# Patient Record
Sex: MALE | Race: OTHER | HISPANIC OR LATINO | URBAN - METROPOLITAN AREA
[De-identification: names, ages, dates, MRNs, and addresses within clinical notes are randomized per-mention and may not be internally consistent; named-entity substitution may affect disease eponyms.]

---

## 2019-12-09 VITALS
TEMPERATURE: 98 F | SYSTOLIC BLOOD PRESSURE: 110 MMHG | HEART RATE: 85 BPM | HEIGHT: 64 IN | RESPIRATION RATE: 18 BRPM | OXYGEN SATURATION: 98 % | DIASTOLIC BLOOD PRESSURE: 73 MMHG | WEIGHT: 198.64 LBS

## 2019-12-10 ENCOUNTER — INPATIENT (INPATIENT)
Facility: HOSPITAL | Age: 39
LOS: 2 days | Discharge: HOME | DRG: 460 | End: 2019-12-13
Attending: ORTHOPAEDIC SURGERY | Admitting: ORTHOPAEDIC SURGERY
Payer: OTHER MISCELLANEOUS

## 2019-12-10 DIAGNOSIS — Z98.890 OTHER SPECIFIED POSTPROCEDURAL STATES: Chronic | ICD-10-CM

## 2019-12-10 DIAGNOSIS — Z41.9 ENCOUNTER FOR PROCEDURE FOR PURPOSES OTHER THAN REMEDYING HEALTH STATE, UNSPECIFIED: Chronic | ICD-10-CM

## 2019-12-10 DIAGNOSIS — M54.16 RADICULOPATHY, LUMBAR REGION: ICD-10-CM

## 2019-12-10 LAB
ANION GAP SERPL CALC-SCNC: 8 MMOL/L — SIGNIFICANT CHANGE UP (ref 5–17)
BASOPHILS # BLD AUTO: 0.02 K/UL — SIGNIFICANT CHANGE UP (ref 0–0.2)
BASOPHILS NFR BLD AUTO: 0.2 % — SIGNIFICANT CHANGE UP (ref 0–2)
BUN SERPL-MCNC: 19 MG/DL — SIGNIFICANT CHANGE UP (ref 7–23)
CALCIUM SERPL-MCNC: 7.8 MG/DL — LOW (ref 8.4–10.5)
CHLORIDE SERPL-SCNC: 109 MMOL/L — HIGH (ref 96–108)
CO2 SERPL-SCNC: 22 MMOL/L — SIGNIFICANT CHANGE UP (ref 22–31)
CREAT SERPL-MCNC: 0.8 MG/DL — SIGNIFICANT CHANGE UP (ref 0.5–1.3)
EOSINOPHIL # BLD AUTO: 0.03 K/UL — SIGNIFICANT CHANGE UP (ref 0–0.5)
EOSINOPHIL NFR BLD AUTO: 0.4 % — SIGNIFICANT CHANGE UP (ref 0–6)
GLUCOSE SERPL-MCNC: 108 MG/DL — HIGH (ref 70–99)
HCT VFR BLD CALC: 33.6 % — LOW (ref 39–50)
HGB BLD-MCNC: 10.7 G/DL — LOW (ref 13–17)
IMM GRANULOCYTES NFR BLD AUTO: 0.5 % — SIGNIFICANT CHANGE UP (ref 0–1.5)
LYMPHOCYTES # BLD AUTO: 1.58 K/UL — SIGNIFICANT CHANGE UP (ref 1–3.3)
LYMPHOCYTES # BLD AUTO: 19 % — SIGNIFICANT CHANGE UP (ref 13–44)
MCHC RBC-ENTMCNC: 27.3 PG — SIGNIFICANT CHANGE UP (ref 27–34)
MCHC RBC-ENTMCNC: 31.8 GM/DL — LOW (ref 32–36)
MCV RBC AUTO: 85.7 FL — SIGNIFICANT CHANGE UP (ref 80–100)
MONOCYTES # BLD AUTO: 0.2 K/UL — SIGNIFICANT CHANGE UP (ref 0–0.9)
MONOCYTES NFR BLD AUTO: 2.4 % — SIGNIFICANT CHANGE UP (ref 2–14)
NEUTROPHILS # BLD AUTO: 6.44 K/UL — SIGNIFICANT CHANGE UP (ref 1.8–7.4)
NEUTROPHILS NFR BLD AUTO: 77.5 % — HIGH (ref 43–77)
NRBC # BLD: 0 /100 WBCS — SIGNIFICANT CHANGE UP (ref 0–0)
PLATELET # BLD AUTO: 234 K/UL — SIGNIFICANT CHANGE UP (ref 150–400)
POTASSIUM SERPL-MCNC: 4.8 MMOL/L — SIGNIFICANT CHANGE UP (ref 3.5–5.3)
POTASSIUM SERPL-SCNC: 4.8 MMOL/L — SIGNIFICANT CHANGE UP (ref 3.5–5.3)
RBC # BLD: 3.92 M/UL — LOW (ref 4.2–5.8)
RBC # FLD: 14.2 % — SIGNIFICANT CHANGE UP (ref 10.3–14.5)
SODIUM SERPL-SCNC: 139 MMOL/L — SIGNIFICANT CHANGE UP (ref 135–145)
WBC # BLD: 8.31 K/UL — SIGNIFICANT CHANGE UP (ref 3.8–10.5)
WBC # FLD AUTO: 8.31 K/UL — SIGNIFICANT CHANGE UP (ref 3.8–10.5)

## 2019-12-10 RX ORDER — HYDROMORPHONE HYDROCHLORIDE 2 MG/ML
0.5 INJECTION INTRAMUSCULAR; INTRAVENOUS; SUBCUTANEOUS
Refills: 0 | Status: DISCONTINUED | OUTPATIENT
Start: 2019-12-10 | End: 2019-12-13

## 2019-12-10 RX ORDER — CEFAZOLIN SODIUM 1 G
2000 VIAL (EA) INJECTION EVERY 8 HOURS
Refills: 0 | Status: COMPLETED | OUTPATIENT
Start: 2019-12-10 | End: 2019-12-11

## 2019-12-10 RX ORDER — METOCLOPRAMIDE HCL 10 MG
10 TABLET ORAL ONCE
Refills: 0 | Status: DISCONTINUED | OUTPATIENT
Start: 2019-12-10 | End: 2019-12-11

## 2019-12-10 RX ORDER — DEXAMETHASONE 0.5 MG/5ML
10 ELIXIR ORAL EVERY 8 HOURS
Refills: 0 | Status: COMPLETED | OUTPATIENT
Start: 2019-12-10 | End: 2019-12-11

## 2019-12-10 RX ORDER — SODIUM CHLORIDE 9 MG/ML
1000 INJECTION, SOLUTION INTRAVENOUS
Refills: 0 | Status: DISCONTINUED | OUTPATIENT
Start: 2019-12-10 | End: 2019-12-11

## 2019-12-10 RX ORDER — CEFAZOLIN SODIUM 1 G
2000 VIAL (EA) INJECTION EVERY 8 HOURS
Refills: 0 | Status: DISCONTINUED | OUTPATIENT
Start: 2019-12-10 | End: 2019-12-10

## 2019-12-10 RX ORDER — OXYCODONE HYDROCHLORIDE 5 MG/1
10 TABLET ORAL EVERY 4 HOURS
Refills: 0 | Status: DISCONTINUED | OUTPATIENT
Start: 2019-12-10 | End: 2019-12-13

## 2019-12-10 RX ORDER — HYDROMORPHONE HYDROCHLORIDE 2 MG/ML
0.5 INJECTION INTRAMUSCULAR; INTRAVENOUS; SUBCUTANEOUS
Refills: 0 | Status: DISCONTINUED | OUTPATIENT
Start: 2019-12-10 | End: 2019-12-11

## 2019-12-10 RX ORDER — MAGNESIUM HYDROXIDE 400 MG/1
30 TABLET, CHEWABLE ORAL EVERY 12 HOURS
Refills: 0 | Status: DISCONTINUED | OUTPATIENT
Start: 2019-12-10 | End: 2019-12-13

## 2019-12-10 RX ORDER — LANOLIN ALCOHOL/MO/W.PET/CERES
5 CREAM (GRAM) TOPICAL AT BEDTIME
Refills: 0 | Status: DISCONTINUED | OUTPATIENT
Start: 2019-12-10 | End: 2019-12-10

## 2019-12-10 RX ORDER — SODIUM CHLORIDE 9 MG/ML
500 INJECTION, SOLUTION INTRAVENOUS ONCE
Refills: 0 | Status: COMPLETED | OUTPATIENT
Start: 2019-12-10 | End: 2019-12-10

## 2019-12-10 RX ORDER — CHLORHEXIDINE GLUCONATE 213 G/1000ML
1 SOLUTION TOPICAL ONCE
Refills: 0 | Status: DISCONTINUED | OUTPATIENT
Start: 2019-12-10 | End: 2019-12-10

## 2019-12-10 RX ORDER — ACETAMINOPHEN 500 MG
650 TABLET ORAL EVERY 6 HOURS
Refills: 0 | Status: DISCONTINUED | OUTPATIENT
Start: 2019-12-10 | End: 2019-12-13

## 2019-12-10 RX ORDER — GABAPENTIN 400 MG/1
800 CAPSULE ORAL DAILY
Refills: 0 | Status: DISCONTINUED | OUTPATIENT
Start: 2019-12-10 | End: 2019-12-13

## 2019-12-10 RX ORDER — SENNA PLUS 8.6 MG/1
2 TABLET ORAL AT BEDTIME
Refills: 0 | Status: DISCONTINUED | OUTPATIENT
Start: 2019-12-10 | End: 2019-12-13

## 2019-12-10 RX ORDER — POVIDONE-IODINE 5 %
1 AEROSOL (ML) TOPICAL ONCE
Refills: 0 | Status: DISCONTINUED | OUTPATIENT
Start: 2019-12-10 | End: 2019-12-10

## 2019-12-10 RX ORDER — OXYCODONE HYDROCHLORIDE 5 MG/1
5 TABLET ORAL EVERY 4 HOURS
Refills: 0 | Status: DISCONTINUED | OUTPATIENT
Start: 2019-12-10 | End: 2019-12-13

## 2019-12-10 RX ORDER — MAGNESIUM SULFATE 500 MG/ML
2 VIAL (ML) INJECTION ONCE
Refills: 0 | Status: DISCONTINUED | OUTPATIENT
Start: 2019-12-10 | End: 2019-12-11

## 2019-12-10 RX ADMIN — SENNA PLUS 2 TABLET(S): 8.6 TABLET ORAL at 21:21

## 2019-12-10 RX ADMIN — Medication 102 MILLIGRAM(S): at 19:03

## 2019-12-10 RX ADMIN — HYDROMORPHONE HYDROCHLORIDE 0.5 MILLIGRAM(S): 2 INJECTION INTRAMUSCULAR; INTRAVENOUS; SUBCUTANEOUS at 14:06

## 2019-12-10 RX ADMIN — HYDROMORPHONE HYDROCHLORIDE 0.5 MILLIGRAM(S): 2 INJECTION INTRAMUSCULAR; INTRAVENOUS; SUBCUTANEOUS at 15:50

## 2019-12-10 RX ADMIN — Medication 2000 MILLIGRAM(S): at 19:03

## 2019-12-10 RX ADMIN — HYDROMORPHONE HYDROCHLORIDE 0.5 MILLIGRAM(S): 2 INJECTION INTRAMUSCULAR; INTRAVENOUS; SUBCUTANEOUS at 14:40

## 2019-12-10 RX ADMIN — HYDROMORPHONE HYDROCHLORIDE 0.5 MILLIGRAM(S): 2 INJECTION INTRAMUSCULAR; INTRAVENOUS; SUBCUTANEOUS at 15:30

## 2019-12-10 RX ADMIN — SODIUM CHLORIDE 2000 MILLILITER(S): 9 INJECTION, SOLUTION INTRAVENOUS at 14:00

## 2019-12-10 NOTE — PROGRESS NOTE ADULT - SUBJECTIVE AND OBJECTIVE BOX
Orthopedics Post Op Check    Procedure: LUMBAR DECOMPRESSION/FUSION L5-S1  Surgeon:FABIO Adam. stable, c/o pain pain in low back. Denies CP, SOB, N/V, numbness/tingling in b/l les.     Vital Signs Last 24 Hrs  T(C): 36.2 (10 Dec 2019 13:42), Max: 36.2 (10 Dec 2019 13:42)  T(F): 97.2 (10 Dec 2019 13:42), Max: 97.2 (10 Dec 2019 13:42)  HR: 84 (10 Dec 2019 14:38) (82 - 84)  BP: 97/56 (10 Dec 2019 14:30) (79/49 - 102/47)  BP(mean): 75 (10 Dec 2019 14:30) (53 - 75)  RR: 20 (10 Dec 2019 14:38) (20 - 27)  SpO2: 97% (10 Dec 2019 14:38) (95% - 97%)      Dressing C/D/I with 1 drain     Pulses: DP/PT 2+ B/L LES  SLT:  intact B/L LES  Motor:  EHL/FHL/TA/GS  5/5 b/l les                          10.7   8.31  )-----------( 234      ( 10 Dec 2019 14:39 )             33.6   12-10    139  |  109<H>  |  19  ----------------------------<  108<H>  4.8   |  22  |  0.80    Ca    7.8<L>      10 Dec 2019 14:39      Post op XR: pending pod 2    A/P: 39yoMale POD#0 s/p LUMBAR DECOMPRESSION/FUSION L5-S1  - Stable  - Pain Control  - DVT ppx: scds  - Post op abx: ancef   - PT, WBS: wbat b/l les  - F/U AM Labs

## 2019-12-10 NOTE — H&P ADULT - NSHPLABSRESULTS_GEN_ALL_CORE
Preop CBC, BMP, PT/PTT/INR, UA - WNL per medical clearance  Preop EKG - sinus rhythm - WNL per medical clearance

## 2019-12-10 NOTE — H&P ADULT - HISTORY OF PRESENT ILLNESS
39M c/o low back pain x     Present for elective Lami/Fusion L5-S1 39M c/o low back pain x one month. Pt endorses work related injury on Nov 8 endorsing an object falling onto his head. He states his low back pain radiates down his posterior left lower extremity. He denies numbness/tingling/weakness of bilateral lower extremities. Pt takes gabapentin for his symptoms - he has failed conservative management. Pt has ambulated intermittently with a cane since time of injury. He denies DVT hx; denies CP, SOB, N/V, tactile fevers.    Present for elective Lami/Fusion L5-S1

## 2019-12-10 NOTE — H&P ADULT - NSHPPHYSICALEXAM_GEN_ALL_CORE
MSK: + decreased ROM 2/2 pain, lumbosacral spine  Skin warm and well perfused, no visible wounds/erythema/ecchymoses  EHL/FHL/TA/GS   SLT   DP/PT pulses    Remainder of exam per medical clearance note MSK: + decreased ROM 2/2 pain, lumbosacral spine  Skin warm and well perfused, no visible wounds/erythema/ecchymoses  EHL/FHL/TA/GS 5/5 motor strength bilaterally   SLT in tact to distal bilateral lower extremities   DP/PT pulses 2+    Remainder of exam per medical clearance note

## 2019-12-10 NOTE — H&P ADULT - PROBLEM SELECTOR PLAN 1
Admit to Orthopaedic Service.  Presents today for elective Lami/PSF L5-S1   Pt medically stable and cleared for procedure today by  Admit to Orthopaedic Service.  Presents today for elective Lami/PSF L5-S1   Pt medically stable and cleared for procedure today by Dr. Duenas

## 2019-12-10 NOTE — PACU DISCHARGE NOTE - COMMENTS
PT met pacu criteria to be tx to regional. Vitals stable, no bleeding noted, pain controlled.  Report given to HILARIO Gloria on [id number]

## 2019-12-11 ENCOUNTER — TRANSCRIPTION ENCOUNTER (OUTPATIENT)
Age: 39
End: 2019-12-11

## 2019-12-11 LAB
ALBUMIN SERPL ELPH-MCNC: 3.4 G/DL — SIGNIFICANT CHANGE UP (ref 3.3–5)
ANION GAP SERPL CALC-SCNC: 9 MMOL/L — SIGNIFICANT CHANGE UP (ref 5–17)
BASOPHILS # BLD AUTO: 0.02 K/UL — SIGNIFICANT CHANGE UP (ref 0–0.2)
BASOPHILS NFR BLD AUTO: 0.1 % — SIGNIFICANT CHANGE UP (ref 0–2)
BUN SERPL-MCNC: 15 MG/DL — SIGNIFICANT CHANGE UP (ref 7–23)
CALCIUM SERPL-MCNC: 8 MG/DL — LOW (ref 8.4–10.5)
CHLORIDE SERPL-SCNC: 106 MMOL/L — SIGNIFICANT CHANGE UP (ref 96–108)
CO2 SERPL-SCNC: 22 MMOL/L — SIGNIFICANT CHANGE UP (ref 22–31)
CREAT SERPL-MCNC: 0.72 MG/DL — SIGNIFICANT CHANGE UP (ref 0.5–1.3)
EOSINOPHIL # BLD AUTO: 0 K/UL — SIGNIFICANT CHANGE UP (ref 0–0.5)
EOSINOPHIL NFR BLD AUTO: 0 % — SIGNIFICANT CHANGE UP (ref 0–6)
GLUCOSE SERPL-MCNC: 139 MG/DL — HIGH (ref 70–99)
HCT VFR BLD CALC: 35 % — LOW (ref 39–50)
HGB BLD-MCNC: 11.3 G/DL — LOW (ref 13–17)
IMM GRANULOCYTES NFR BLD AUTO: 0.7 % — SIGNIFICANT CHANGE UP (ref 0–1.5)
LYMPHOCYTES # BLD AUTO: 1.22 K/UL — SIGNIFICANT CHANGE UP (ref 1–3.3)
LYMPHOCYTES # BLD AUTO: 7.2 % — LOW (ref 13–44)
MCHC RBC-ENTMCNC: 27.2 PG — SIGNIFICANT CHANGE UP (ref 27–34)
MCHC RBC-ENTMCNC: 32.3 GM/DL — SIGNIFICANT CHANGE UP (ref 32–36)
MCV RBC AUTO: 84.1 FL — SIGNIFICANT CHANGE UP (ref 80–100)
MONOCYTES # BLD AUTO: 0.58 K/UL — SIGNIFICANT CHANGE UP (ref 0–0.9)
MONOCYTES NFR BLD AUTO: 3.4 % — SIGNIFICANT CHANGE UP (ref 2–14)
NEUTROPHILS # BLD AUTO: 14.9 K/UL — HIGH (ref 1.8–7.4)
NEUTROPHILS NFR BLD AUTO: 88.6 % — HIGH (ref 43–77)
NRBC # BLD: 0 /100 WBCS — SIGNIFICANT CHANGE UP (ref 0–0)
PLATELET # BLD AUTO: 244 K/UL — SIGNIFICANT CHANGE UP (ref 150–400)
POTASSIUM SERPL-MCNC: 4.3 MMOL/L — SIGNIFICANT CHANGE UP (ref 3.5–5.3)
POTASSIUM SERPL-SCNC: 4.3 MMOL/L — SIGNIFICANT CHANGE UP (ref 3.5–5.3)
RBC # BLD: 4.16 M/UL — LOW (ref 4.2–5.8)
RBC # FLD: 14 % — SIGNIFICANT CHANGE UP (ref 10.3–14.5)
SODIUM SERPL-SCNC: 137 MMOL/L — SIGNIFICANT CHANGE UP (ref 135–145)
WBC # BLD: 16.83 K/UL — HIGH (ref 3.8–10.5)
WBC # FLD AUTO: 16.83 K/UL — HIGH (ref 3.8–10.5)

## 2019-12-11 PROCEDURE — 99255 IP/OBS CONSLTJ NEW/EST HI 80: CPT

## 2019-12-11 RX ORDER — POLYETHYLENE GLYCOL 3350 17 G/17G
17 POWDER, FOR SOLUTION ORAL DAILY
Refills: 0 | Status: DISCONTINUED | OUTPATIENT
Start: 2019-12-11 | End: 2019-12-13

## 2019-12-11 RX ORDER — BENZOCAINE AND MENTHOL 5; 1 G/100ML; G/100ML
2 LIQUID ORAL EVERY 4 HOURS
Refills: 0 | Status: DISCONTINUED | OUTPATIENT
Start: 2019-12-11 | End: 2019-12-13

## 2019-12-11 RX ADMIN — OXYCODONE HYDROCHLORIDE 10 MILLIGRAM(S): 5 TABLET ORAL at 17:12

## 2019-12-11 RX ADMIN — BENZOCAINE AND MENTHOL 2 LOZENGE: 5; 1 LIQUID ORAL at 12:23

## 2019-12-11 RX ADMIN — GABAPENTIN 800 MILLIGRAM(S): 400 CAPSULE ORAL at 12:23

## 2019-12-11 RX ADMIN — OXYCODONE HYDROCHLORIDE 10 MILLIGRAM(S): 5 TABLET ORAL at 18:12

## 2019-12-11 RX ADMIN — Medication 2000 MILLIGRAM(S): at 02:16

## 2019-12-11 RX ADMIN — Medication 102 MILLIGRAM(S): at 02:16

## 2019-12-11 RX ADMIN — OXYCODONE HYDROCHLORIDE 10 MILLIGRAM(S): 5 TABLET ORAL at 10:35

## 2019-12-11 RX ADMIN — SENNA PLUS 2 TABLET(S): 8.6 TABLET ORAL at 21:30

## 2019-12-11 RX ADMIN — POLYETHYLENE GLYCOL 3350 17 GRAM(S): 17 POWDER, FOR SOLUTION ORAL at 12:25

## 2019-12-11 RX ADMIN — Medication 102 MILLIGRAM(S): at 10:35

## 2019-12-11 RX ADMIN — OXYCODONE HYDROCHLORIDE 10 MILLIGRAM(S): 5 TABLET ORAL at 11:35

## 2019-12-11 RX ADMIN — BENZOCAINE AND MENTHOL 2 LOZENGE: 5; 1 LIQUID ORAL at 17:29

## 2019-12-11 NOTE — DISCHARGE NOTE PROVIDER - NSDCMRMEDTOKEN_GEN_ALL_CORE_FT
gabapentin 800 mg oral tablet: 1 tab(s) orally once a day  Lumbar Corset: acetaminophen 325 mg oral tablet: 2 tab(s) orally every 6 hours, As needed, Temp greater or equal to 38C (100.4F), Mild Pain (1 - 3)  cefadroxil 500 mg oral capsule: 1 cap(s) orally 2 times a day   gabapentin 800 mg oral tablet: 1 tab(s) orally once a day  Lumbar Corset:   oxyCODONE 5 mg oral tablet: 1-2 tab(s) orally every 4-6 hours as needed for severe pain MDD:6  polyethylene glycol 3350 oral powder for reconstitution: 17 gram(s) orally once a day  senna oral tablet: 2 tab(s) orally once a day (at bedtime)

## 2019-12-11 NOTE — DISCHARGE NOTE PROVIDER - CARE PROVIDER_API CALL
Vincenzo Duenas (DO)  Orthopaedic Surgery  [address] [place], [place] [address]  Phone: [phone]  Fax: [phone]  Follow Up Time:

## 2019-12-11 NOTE — DISCHARGE NOTE PROVIDER - HOSPITAL COURSE
Admitted    Surgery - 12/10/2019 - L5-S1 fusion    Macy-op Antibiotics    Pain control    DVT prophylaxis    OOB/Physical Therapy

## 2019-12-11 NOTE — PROGRESS NOTE ADULT - SUBJECTIVE AND OBJECTIVE BOX
Ortho Note    Pt comfortable without complaints, pain controlled  Denies CP, SOB, N/V, numbness/tingling     Vital Signs Last 24 Hrs  AVSS    General: Pt Alert and oriented, NAD  DSG C/D/I back, drain x 1  Pulses intact b/l LE  Sensation intact b/l LE  Motor: EHL/FHL/TA/GS 5/5 b/l                          11.3   16.83 )-----------( 244      ( 11 Dec 2019 08:09 )             35.0   11 Dec 2019 08:09    137    |  106    |  15     ----------------------------<  139    4.3     |  22     |  0.72           A/P: 39yMale POD#1 s/p PSF L5-S1  - Stable  - Pain Control  - DVT ppx: scds  - PT, WBS: wbat    Ortho Pager [phone]

## 2019-12-11 NOTE — CONSULT NOTE ADULT - ASSESSMENT
39 year old M s/p PSF, medicine following for comanagement.     1) Post-op state: c/w pain control, c/w bowel regimen, c/w IS  2) Cough: likely 2/2 viral URI, no fevers/chills/cough, non-toxic appearing, crackles at L base likely atelectasis  3) Leukocytosis: likely reactive to surgery  4) Anemia: likely acute blood loss from surgery

## 2019-12-11 NOTE — DISCHARGE NOTE PROVIDER - NSDCFUADDINST_GEN_ALL_CORE_FT
No strenuous activity (bending/twisting), heavy lifting, driving or returning to work until cleared by MD.  Change dressing daily with gauze/tape till post-op day #5, then leave incision open to air.  You may shower post-op day#5, keep incision clean and dry.   Try to have regular bowel movements, take stool softener or laxative if necessary.  May take pepcid or prilosec for upset stomach.  May apply ice to affected areas to decrease swelling.  Call to schedule an appt with Dr. Duenas for follow up, if you have staples or sutures they will be removed in office.  Contact your doctor if you experience: fever greater than 101.5, chills, chest pain, difficulty breathing, redness or excessive drainage around the incision, other concerns.  Follow up with your primary care provider.

## 2019-12-11 NOTE — PROGRESS NOTE ADULT - SUBJECTIVE AND OBJECTIVE BOX
pt seen and examined nad avss    pe dressing cdi   nvi   power 5/5  sensation grossly intact   no calf tenderness    imp sp psf    plan     ambulation pt  pain control  scd  drain  xrays

## 2019-12-11 NOTE — DISCHARGE NOTE PROVIDER - NSDCCPTREATMENT_GEN_ALL_CORE_FT
PRINCIPAL PROCEDURE  Procedure: Decompression, spine, lumbar, with fusion  Findings and Treatment: L5-S1

## 2019-12-11 NOTE — CONSULT NOTE ADULT - SUBJECTIVE AND OBJECTIVE BOX
Patient is a 39y old  Male who presents with a chief complaint of low back pain (11 Dec 2019 10:53)      HPI:  39M c/o low back pain x one month. Pt endorses work related injury on Nov 8 endorsing an object falling onto his head. He states his low back pain radiates down his posterior left lower extremity. He denies numbness/tingling/weakness of bilateral lower extremities. Pt takes gabapentin for his symptoms - he has failed conservative management. Pt has ambulated intermittently with a cane since time of injury. He denies DVT hx; denies CP, SOB, N/V, tactile fevers.    Present for elective Lami/Fusion L5-S1 (10 Dec 2019 08:13)    Seen in AM, POD 1, pain controlled. Has mild cough, not very productive. Lives with wife and small child. Denies fevers, chills, shortness of breath.     REVIEW OF SYSTEMS      General:	    Skin/Breast:  	  Ophthalmologic:  	  ENMT:	    Respiratory and Thorax:  	  Cardiovascular:	    Gastrointestinal:	    Genitourinary:	    Musculoskeletal:	    Neurological:	    Psychiatric:	    Hematology/Lymphatics:	    Endocrine:	    Allergic/Immunologic:	    Allergies    No Known Allergies    Intolerances        Home Medications:  gabapentin 800 mg oral tablet: 1 tab(s) orally once a day (10 Dec 2019 08:22)      PAST MEDICAL & SURGICAL HISTORY:  Lumbar radiculopathy  H/O left knee surgery  Elective surgery: left shoulder surgery      FAMILY HISTORY: noncontributory      SOCIAL HISTORY: no smoking or illicit drug use      Vital Signs Last 24 Hrs  T(C): 36.5 (11 Dec 2019 08:20), Max: 36.7 (11 Dec 2019 00:15)  T(F): 97.7 (11 Dec 2019 08:20), Max: 98 (11 Dec 2019 00:15)  HR: 83 (11 Dec 2019 08:20) (80 - 95)  BP: 95/55 (11 Dec 2019 08:20) (91/47 - 110/65)  BP(mean): 78 (10 Dec 2019 16:30) (58 - 82)  RR: 16 (11 Dec 2019 08:20) (15 - 31)  SpO2: 95% (11 Dec 2019 08:20) (95% - 97%)   I&O's Detail    10 Dec 2019 07:01  -  11 Dec 2019 07:00  --------------------------------------------------------  IN:    lactated ringers.: 360 mL  Total IN: 360 mL    OUT:    Accordian: 25 mL    Voided: 680 mL  Total OUT: 705 mL    Total NET: -345 mL        Daily     Daily     Physical Exam:   GEN: NAD, AAOx3  HEENT: MMM,   CV: S1 S2 RRR, no MRG  Lung: minimal crackles L base, otherwise clear, no rhonchi, no egophony  Abd: soft NT ND +BS  Ext: WWP    MEDICATIONS  (STANDING):  gabapentin 800 milliGRAM(s) Oral daily  polyethylene glycol 3350 17 Gram(s) Oral daily  senna 2 Tablet(s) Oral at bedtime    MEDICATIONS  (PRN):  acetaminophen   Tablet .. 650 milliGRAM(s) Oral every 6 hours PRN Temp greater or equal to 38C (100.4F), Mild Pain (1 - 3)  aluminum hydroxide/magnesium hydroxide/simethicone Suspension 30 milliLiter(s) Oral every 12 hours PRN Indigestion  benzocaine 15 mG/menthol 3.6 mG (Sugar-Free) Lozenge 2 Lozenge Oral every 4 hours PRN Sore Throat  guaiFENesin   Syrup  (Sugar-Free) 200 milliGRAM(s) Oral every 6 hours PRN Cough  HYDROmorphone  Injectable 0.5 milliGRAM(s) IV Push every 3 hours PRN BREAKTHROUGH PAIN FOR FLOOR  magnesium hydroxide Suspension 30 milliLiter(s) Oral every 12 hours PRN Constipation  oxyCODONE    IR 5 milliGRAM(s) Oral every 4 hours PRN Moderate Pain (4 - 6)  oxyCODONE    IR 10 milliGRAM(s) Oral every 4 hours PRN Severe Pain (7 - 10)                LABS:                        11.3   16.83 )-----------( 244      ( 11 Dec 2019 08:09 )             35.0     12-11    137  |  106  |  15  ----------------------------<  139<H>  4.3   |  22  |  0.72    Ca    8.0<L>      11 Dec 2019 08:09            CAPILLARY BLOOD GLUCOSE          RADIOLOGY & ADDITIONAL STUDIES:

## 2019-12-12 LAB
ANION GAP SERPL CALC-SCNC: 8 MMOL/L — SIGNIFICANT CHANGE UP (ref 5–17)
BASOPHILS # BLD AUTO: 0.01 K/UL — SIGNIFICANT CHANGE UP (ref 0–0.2)
BASOPHILS NFR BLD AUTO: 0.1 % — SIGNIFICANT CHANGE UP (ref 0–2)
BUN SERPL-MCNC: 14 MG/DL — SIGNIFICANT CHANGE UP (ref 7–23)
CALCIUM SERPL-MCNC: 8.4 MG/DL — SIGNIFICANT CHANGE UP (ref 8.4–10.5)
CHLORIDE SERPL-SCNC: 102 MMOL/L — SIGNIFICANT CHANGE UP (ref 96–108)
CO2 SERPL-SCNC: 27 MMOL/L — SIGNIFICANT CHANGE UP (ref 22–31)
CREAT SERPL-MCNC: 0.72 MG/DL — SIGNIFICANT CHANGE UP (ref 0.5–1.3)
EOSINOPHIL # BLD AUTO: 0 K/UL — SIGNIFICANT CHANGE UP (ref 0–0.5)
EOSINOPHIL NFR BLD AUTO: 0 % — SIGNIFICANT CHANGE UP (ref 0–6)
GLUCOSE SERPL-MCNC: 101 MG/DL — HIGH (ref 70–99)
HCT VFR BLD CALC: 36.8 % — LOW (ref 39–50)
HGB BLD-MCNC: 11.9 G/DL — LOW (ref 13–17)
IMM GRANULOCYTES NFR BLD AUTO: 0.7 % — SIGNIFICANT CHANGE UP (ref 0–1.5)
LYMPHOCYTES # BLD AUTO: 18.2 % — SIGNIFICANT CHANGE UP (ref 13–44)
LYMPHOCYTES # BLD AUTO: 3.07 K/UL — SIGNIFICANT CHANGE UP (ref 1–3.3)
MCHC RBC-ENTMCNC: 27.2 PG — SIGNIFICANT CHANGE UP (ref 27–34)
MCHC RBC-ENTMCNC: 32.3 GM/DL — SIGNIFICANT CHANGE UP (ref 32–36)
MCV RBC AUTO: 84 FL — SIGNIFICANT CHANGE UP (ref 80–100)
MONOCYTES # BLD AUTO: 1.46 K/UL — HIGH (ref 0–0.9)
MONOCYTES NFR BLD AUTO: 8.6 % — SIGNIFICANT CHANGE UP (ref 2–14)
NEUTROPHILS # BLD AUTO: 12.26 K/UL — HIGH (ref 1.8–7.4)
NEUTROPHILS NFR BLD AUTO: 72.4 % — SIGNIFICANT CHANGE UP (ref 43–77)
NRBC # BLD: 0 /100 WBCS — SIGNIFICANT CHANGE UP (ref 0–0)
PLATELET # BLD AUTO: 241 K/UL — SIGNIFICANT CHANGE UP (ref 150–400)
POTASSIUM SERPL-MCNC: 4.3 MMOL/L — SIGNIFICANT CHANGE UP (ref 3.5–5.3)
POTASSIUM SERPL-SCNC: 4.3 MMOL/L — SIGNIFICANT CHANGE UP (ref 3.5–5.3)
RBC # BLD: 4.38 M/UL — SIGNIFICANT CHANGE UP (ref 4.2–5.8)
RBC # FLD: 14.2 % — SIGNIFICANT CHANGE UP (ref 10.3–14.5)
SODIUM SERPL-SCNC: 137 MMOL/L — SIGNIFICANT CHANGE UP (ref 135–145)
WBC # BLD: 16.91 K/UL — HIGH (ref 3.8–10.5)
WBC # FLD AUTO: 16.91 K/UL — HIGH (ref 3.8–10.5)

## 2019-12-12 PROCEDURE — 72100 X-RAY EXAM L-S SPINE 2/3 VWS: CPT | Mod: 26

## 2019-12-12 PROCEDURE — 99233 SBSQ HOSP IP/OBS HIGH 50: CPT

## 2019-12-12 RX ORDER — ACETAMINOPHEN 500 MG
2 TABLET ORAL
Qty: 0 | Refills: 0 | DISCHARGE
Start: 2019-12-12

## 2019-12-12 RX ORDER — SENNA PLUS 8.6 MG/1
2 TABLET ORAL
Qty: 0 | Refills: 0 | DISCHARGE
Start: 2019-12-12

## 2019-12-12 RX ORDER — OXYCODONE HYDROCHLORIDE 5 MG/1
1 TABLET ORAL
Qty: 28 | Refills: 0
Start: 2019-12-12 | End: 2019-12-18

## 2019-12-12 RX ORDER — POLYETHYLENE GLYCOL 3350 17 G/17G
17 POWDER, FOR SOLUTION ORAL
Qty: 0 | Refills: 0 | DISCHARGE
Start: 2019-12-12

## 2019-12-12 RX ORDER — SODIUM CHLORIDE 9 MG/ML
1000 INJECTION INTRAMUSCULAR; INTRAVENOUS; SUBCUTANEOUS ONCE
Refills: 0 | Status: COMPLETED | OUTPATIENT
Start: 2019-12-12 | End: 2019-12-12

## 2019-12-12 RX ADMIN — BENZOCAINE AND MENTHOL 2 LOZENGE: 5; 1 LIQUID ORAL at 11:17

## 2019-12-12 RX ADMIN — OXYCODONE HYDROCHLORIDE 10 MILLIGRAM(S): 5 TABLET ORAL at 11:17

## 2019-12-12 RX ADMIN — OXYCODONE HYDROCHLORIDE 5 MILLIGRAM(S): 5 TABLET ORAL at 23:15

## 2019-12-12 RX ADMIN — POLYETHYLENE GLYCOL 3350 17 GRAM(S): 17 POWDER, FOR SOLUTION ORAL at 11:49

## 2019-12-12 RX ADMIN — OXYCODONE HYDROCHLORIDE 5 MILLIGRAM(S): 5 TABLET ORAL at 17:17

## 2019-12-12 RX ADMIN — GABAPENTIN 800 MILLIGRAM(S): 400 CAPSULE ORAL at 11:49

## 2019-12-12 RX ADMIN — OXYCODONE HYDROCHLORIDE 5 MILLIGRAM(S): 5 TABLET ORAL at 07:26

## 2019-12-12 RX ADMIN — SENNA PLUS 2 TABLET(S): 8.6 TABLET ORAL at 21:34

## 2019-12-12 RX ADMIN — OXYCODONE HYDROCHLORIDE 10 MILLIGRAM(S): 5 TABLET ORAL at 12:17

## 2019-12-12 RX ADMIN — Medication 200 MILLIGRAM(S): at 06:31

## 2019-12-12 RX ADMIN — OXYCODONE HYDROCHLORIDE 5 MILLIGRAM(S): 5 TABLET ORAL at 16:17

## 2019-12-12 RX ADMIN — OXYCODONE HYDROCHLORIDE 5 MILLIGRAM(S): 5 TABLET ORAL at 06:31

## 2019-12-12 RX ADMIN — SODIUM CHLORIDE 1000 MILLILITER(S): 9 INJECTION INTRAMUSCULAR; INTRAVENOUS; SUBCUTANEOUS at 12:20

## 2019-12-12 NOTE — PROGRESS NOTE ADULT - SUBJECTIVE AND OBJECTIVE BOX
Orthopaedic Surgery Progress Note    Post-operative day #2 s/p lumbar fusion L5-S1    Subjective:     Patient seen and examined. Patient comfortable without complaints, pain controlled.  Denies chest pain, shortness of breath, nausea/vomiting, numbness/tingling.    Objective:    Vital Signs Last 24 Hrs  T(C): 36.6 (12-12-19 @ 08:20), Max: 36.7 (12-12-19 @ 06:28)  T(F): 97.8 (12-12-19 @ 08:20), Max: 98 (12-12-19 @ 06:28)  HR: 85 (12-12-19 @ 08:20) (85 - 87)  BP: 100/62 (12-12-19 @ 08:20) (100/62 - 101/61)  BP(mean): --  RR: 17 (12-12-19 @ 08:20) (16 - 17)  SpO2: 95% (12-12-19 @ 08:20) (95% - 97%)  AVSS    PE:  General: Patient alert and oriented, NAD  Dressing: Clean/dry/intact back, HVx1 holding suction   Pulses: DP pulses palpable bilateral lower extremities   Sensation: intact to bilateral lower extremities   Motor: EHL/FHL/TA/GS firing bilateral lower extremities                           11.9   16.91 )-----------( 241      ( 12 Dec 2019 06:51 )             36.8   12 Dec 2019 06:51    137    |  102    |  14     ----------------------------<  101    4.3     |  27     |  0.72     Ca    8.4        12 Dec 2019 06:51    TPro  x      /  Alb  3.4    /  TBili  x      /  DBili  x      /  AST  x      /  ALT  x      /  AlkPhos  x      11 Dec 2019 08:09      A/P: 39yMale POD#2 s/p above procedure   1. Pain control as needed  2. DVT prophylaxis: SCDs  3. PT, weight-bearing status: WBAT, pending PT clearance  4. Dispo: pending PT clearance/HV drain output  5. HV drain management   6. postop xray ordered for today   7. Dr. Pop following for medical co-management; appreciate recommendations    Ortho Pager [phone] Orthopaedic Surgery Progress Note    Post-operative day #2 s/p lumbar fusion L5-S1    Subjective:     Patient seen and examined. Patient comfortable without complaints, pain controlled.  Denies chest pain, shortness of breath, nausea/vomiting, numbness/tingling.    Objective:    Vital Signs Last 24 Hrs  T(C): 36.6 (12-12-19 @ 08:20), Max: 36.7 (12-12-19 @ 06:28)  T(F): 97.8 (12-12-19 @ 08:20), Max: 98 (12-12-19 @ 06:28)  HR: 85 (12-12-19 @ 08:20) (85 - 87)  BP: 100/62 (12-12-19 @ 08:20) (100/62 - 101/61)  BP(mean): --  RR: 17 (12-12-19 @ 08:20) (16 - 17)  SpO2: 95% (12-12-19 @ 08:20) (95% - 97%)  AVSS    PE:  General: Patient alert and oriented, NAD  Dressing: Clean/dry/intact back, HVx1 holding suction   Pulses: DP pulses palpable bilateral lower extremities   Sensation: intact to bilateral lower extremities   Motor: EHL/FHL/TA/GS firing bilateral lower extremities                           11.9   16.91 )-----------( 241      ( 12 Dec 2019 06:51 )             36.8   12 Dec 2019 06:51    137    |  102    |  14     ----------------------------<  101    4.3     |  27     |  0.72     Ca    8.4        12 Dec 2019 06:51    TPro  x      /  Alb  3.4    /  TBili  x      /  DBili  x      /  AST  x      /  ALT  x      /  AlkPhos  x      11 Dec 2019 08:09      A/P: 39yMale POD#2 s/p above procedure   1. Pain control as needed  2. DVT prophylaxis: SCDs  3. PT, weight-bearing status: WBAT, pending PT clearance  4. Dispo: pending PT clearance/HV drain output  5. HV drain management   6. postop xray ordered for today   7. Dr. Pop following for medical co-management; appreciate recommendations    addendum:   Pt complaining of dizziness after returning from xray. /60. Per Dr. Pop, 1L bolus ordered     Ortho Pager [phone]

## 2019-12-12 NOTE — PROGRESS NOTE ADULT - SUBJECTIVE AND OBJECTIVE BOX
OVERNIGHT EVENTS:    SUBJECTIVE / INTERVAL HPI: Patient seen and examined at bedside. Cough improved.     VITAL SIGNS:  Vital Signs Last 24 Hrs  T(C): 36.6 (12 Dec 2019 08:20), Max: 36.8 (11 Dec 2019 15:13)  T(F): 97.8 (12 Dec 2019 08:20), Max: 98.3 (11 Dec 2019 15:13)  HR: 85 (12 Dec 2019 08:20) (85 - 103)  BP: 100/62 (12 Dec 2019 08:20) (95/54 - 101/61)  BP(mean): --  RR: 17 (12 Dec 2019 08:20) (16 - 17)  SpO2: 95% (12 Dec 2019 08:20) (94% - 97%)    Physical Exam:   GEN: NAD, AAOx3  HEENT: MMM,   CV: S1 S2 RRR, no MRG  Lung: clear  Abd: soft NT ND +BS  Ext: WWP    MEDICATIONS:  MEDICATIONS  (STANDING):  gabapentin 800 milliGRAM(s) Oral daily  polyethylene glycol 3350 17 Gram(s) Oral daily  senna 2 Tablet(s) Oral at bedtime    MEDICATIONS  (PRN):  acetaminophen   Tablet .. 650 milliGRAM(s) Oral every 6 hours PRN Temp greater or equal to 38C (100.4F), Mild Pain (1 - 3)  aluminum hydroxide/magnesium hydroxide/simethicone Suspension 30 milliLiter(s) Oral every 12 hours PRN Indigestion  benzocaine 15 mG/menthol 3.6 mG (Sugar-Free) Lozenge 2 Lozenge Oral every 4 hours PRN Sore Throat  guaiFENesin   Syrup  (Sugar-Free) 200 milliGRAM(s) Oral every 6 hours PRN Cough  HYDROmorphone  Injectable 0.5 milliGRAM(s) IV Push every 3 hours PRN BREAKTHROUGH PAIN FOR FLOOR  magnesium hydroxide Suspension 30 milliLiter(s) Oral every 12 hours PRN Constipation  oxyCODONE    IR 5 milliGRAM(s) Oral every 4 hours PRN Moderate Pain (4 - 6)  oxyCODONE    IR 10 milliGRAM(s) Oral every 4 hours PRN Severe Pain (7 - 10)      ALLERGIES:  Allergies    No Known Allergies    Intolerances        LABS:                        11.9   16.91 )-----------( 241      ( 12 Dec 2019 06:51 )             36.8     12-12    137  |  102  |  14  ----------------------------<  101<H>  4.3   |  27  |  0.72    Ca    8.4      12 Dec 2019 06:51    TPro  x   /  Alb  3.4  /  TBili  x   /  DBili  x   /  AST  x   /  ALT  x   /  AlkPhos  x   12-11        CAPILLARY BLOOD GLUCOSE          RADIOLOGY & ADDITIONAL TESTS: Reviewed.    ASSESSMENT:    PLAN:

## 2019-12-12 NOTE — PROGRESS NOTE ADULT - SUBJECTIVE AND OBJECTIVE BOX
pt seen and examined nad avss    pe dressing cdi   nvi   power 5/5  sensation grossly intact   no calf tenderness    imp sp psf    plan     ambulation pt  pain control  scd  dc to home after clear pt

## 2019-12-12 NOTE — PROGRESS NOTE ADULT - ASSESSMENT
39 year old M s/p PSF, medicine following for comanagement.     1) Post-op state: c/w pain control, c/w bowel regimen, c/w IS  2) Cough: likely 2/2 viral URI, no fevers/chills/cough, non-toxic appearing,   3) Leukocytosis: likely reactive to surgery  4) Anemia: likely acute blood loss from surgery

## 2019-12-13 ENCOUNTER — TRANSCRIPTION ENCOUNTER (OUTPATIENT)
Age: 39
End: 2019-12-13

## 2019-12-13 VITALS
OXYGEN SATURATION: 98 % | HEART RATE: 83 BPM | DIASTOLIC BLOOD PRESSURE: 62 MMHG | RESPIRATION RATE: 16 BRPM | TEMPERATURE: 98 F | SYSTOLIC BLOOD PRESSURE: 99 MMHG

## 2019-12-13 LAB
ANION GAP SERPL CALC-SCNC: 10 MMOL/L — SIGNIFICANT CHANGE UP (ref 5–17)
BASOPHILS # BLD AUTO: 0.03 K/UL — SIGNIFICANT CHANGE UP (ref 0–0.2)
BASOPHILS NFR BLD AUTO: 0.3 % — SIGNIFICANT CHANGE UP (ref 0–2)
BUN SERPL-MCNC: 13 MG/DL — SIGNIFICANT CHANGE UP (ref 7–23)
CALCIUM SERPL-MCNC: 8.2 MG/DL — LOW (ref 8.4–10.5)
CHLORIDE SERPL-SCNC: 102 MMOL/L — SIGNIFICANT CHANGE UP (ref 96–108)
CO2 SERPL-SCNC: 24 MMOL/L — SIGNIFICANT CHANGE UP (ref 22–31)
CREAT SERPL-MCNC: 0.7 MG/DL — SIGNIFICANT CHANGE UP (ref 0.5–1.3)
EOSINOPHIL # BLD AUTO: 0.05 K/UL — SIGNIFICANT CHANGE UP (ref 0–0.5)
EOSINOPHIL NFR BLD AUTO: 0.5 % — SIGNIFICANT CHANGE UP (ref 0–6)
GLUCOSE SERPL-MCNC: 86 MG/DL — SIGNIFICANT CHANGE UP (ref 70–99)
HCT VFR BLD CALC: 36.3 % — LOW (ref 39–50)
HGB BLD-MCNC: 11.6 G/DL — LOW (ref 13–17)
IMM GRANULOCYTES NFR BLD AUTO: 0.3 % — SIGNIFICANT CHANGE UP (ref 0–1.5)
LYMPHOCYTES # BLD AUTO: 3.55 K/UL — HIGH (ref 1–3.3)
LYMPHOCYTES # BLD AUTO: 32.7 % — SIGNIFICANT CHANGE UP (ref 13–44)
MCHC RBC-ENTMCNC: 27.1 PG — SIGNIFICANT CHANGE UP (ref 27–34)
MCHC RBC-ENTMCNC: 32 GM/DL — SIGNIFICANT CHANGE UP (ref 32–36)
MCV RBC AUTO: 84.8 FL — SIGNIFICANT CHANGE UP (ref 80–100)
MONOCYTES # BLD AUTO: 1.22 K/UL — HIGH (ref 0–0.9)
MONOCYTES NFR BLD AUTO: 11.2 % — SIGNIFICANT CHANGE UP (ref 2–14)
NEUTROPHILS # BLD AUTO: 5.98 K/UL — SIGNIFICANT CHANGE UP (ref 1.8–7.4)
NEUTROPHILS NFR BLD AUTO: 55 % — SIGNIFICANT CHANGE UP (ref 43–77)
NRBC # BLD: 0 /100 WBCS — SIGNIFICANT CHANGE UP (ref 0–0)
PLATELET # BLD AUTO: 250 K/UL — SIGNIFICANT CHANGE UP (ref 150–400)
POTASSIUM SERPL-MCNC: 3.9 MMOL/L — SIGNIFICANT CHANGE UP (ref 3.5–5.3)
POTASSIUM SERPL-SCNC: 3.9 MMOL/L — SIGNIFICANT CHANGE UP (ref 3.5–5.3)
RBC # BLD: 4.28 M/UL — SIGNIFICANT CHANGE UP (ref 4.2–5.8)
RBC # FLD: 14.3 % — SIGNIFICANT CHANGE UP (ref 10.3–14.5)
SODIUM SERPL-SCNC: 136 MMOL/L — SIGNIFICANT CHANGE UP (ref 135–145)
WBC # BLD: 10.86 K/UL — HIGH (ref 3.8–10.5)
WBC # FLD AUTO: 10.86 K/UL — HIGH (ref 3.8–10.5)

## 2019-12-13 RX ADMIN — OXYCODONE HYDROCHLORIDE 10 MILLIGRAM(S): 5 TABLET ORAL at 14:25

## 2019-12-13 RX ADMIN — OXYCODONE HYDROCHLORIDE 10 MILLIGRAM(S): 5 TABLET ORAL at 11:00

## 2019-12-13 RX ADMIN — OXYCODONE HYDROCHLORIDE 10 MILLIGRAM(S): 5 TABLET ORAL at 14:50

## 2019-12-13 RX ADMIN — OXYCODONE HYDROCHLORIDE 5 MILLIGRAM(S): 5 TABLET ORAL at 00:15

## 2019-12-13 RX ADMIN — OXYCODONE HYDROCHLORIDE 10 MILLIGRAM(S): 5 TABLET ORAL at 10:21

## 2019-12-13 RX ADMIN — POLYETHYLENE GLYCOL 3350 17 GRAM(S): 17 POWDER, FOR SOLUTION ORAL at 12:16

## 2019-12-13 RX ADMIN — GABAPENTIN 800 MILLIGRAM(S): 400 CAPSULE ORAL at 12:16

## 2019-12-13 NOTE — PROGRESS NOTE ADULT - REASON FOR ADMISSION
low back pain

## 2019-12-13 NOTE — DISCHARGE NOTE NURSING/CASE MANAGEMENT/SOCIAL WORK - PATIENT PORTAL LINK FT
You can access the FollowMyHealth Patient Portal offered by [facility] by registering at the following website: [email or web address]. By joining [employer]’s FollowMyHealth portal, you will also be able to view your health information using other applications (apps) compatible with our system.

## 2019-12-13 NOTE — PROGRESS NOTE ADULT - SUBJECTIVE AND OBJECTIVE BOX
Orthopaedic Surgery Progress Note    Patient seen and examined. STEFANIE. Patient without complaints. Pain controlled. Denies CP, SOB, N/V, tactile fevers, calf pain.  HV x 1 d/c'd with asepctic technique and dressing changed       Vital Signs Last 24 Hrs  T(C): 36.8 (13 Dec 2019 05:09), Max: 36.8 (12 Dec 2019 15:42)  T(F): 98.2 (13 Dec 2019 05:09), Max: 98.3 (12 Dec 2019 15:42)  HR: 80 (13 Dec 2019 05:09) (80 - 110)  BP: 98/63 (13 Dec 2019 05:09) (98/63 - 110/64)  BP(mean): --  RR: 16 (13 Dec 2019 05:09) (16 - 17)  SpO2: 95% (13 Dec 2019 05:09) (94% - 95%)      Physical Exam:  Pt laying comfortably in bed, NAD.  Skin warm and well perfused, no visible erythema/ecchymoses.  Dressing C/D/I  EHL/FHL/TA/GS 5/5 motor strength bilaterally  SLT in tact to distal bilateral lower extremities  Calves soft and nontender to palpation   DP pulses 2+     LABS                        11.6   10.86 )-----------( 250      ( 13 Dec 2019 08:10 )             36.3                                12-13    136  |  102  |  13  ----------------------------<  86  3.9   |  24  |  0.70    Ca    8.2<L>      13 Dec 2019 08:10          A/P: 39M POD #3 s/p Lumbar Decompression/Fusion L5-S1     CONTINUE:        1. PT: WBAT, cleared for home   2. DVT prophylaxis: SCDs  3. Pain Control as needed   4. Dispo: Home today

## 2019-12-18 PROCEDURE — 85025 COMPLETE CBC W/AUTO DIFF WBC: CPT

## 2019-12-18 PROCEDURE — 86901 BLOOD TYPING SEROLOGIC RH(D): CPT

## 2019-12-18 PROCEDURE — 97161 PT EVAL LOW COMPLEX 20 MIN: CPT

## 2019-12-18 PROCEDURE — 97116 GAIT TRAINING THERAPY: CPT

## 2019-12-18 PROCEDURE — C1889: CPT

## 2019-12-18 PROCEDURE — 72100 X-RAY EXAM L-S SPINE 2/3 VWS: CPT

## 2019-12-18 PROCEDURE — 82040 ASSAY OF SERUM ALBUMIN: CPT

## 2019-12-18 PROCEDURE — 36415 COLL VENOUS BLD VENIPUNCTURE: CPT

## 2019-12-18 PROCEDURE — 86850 RBC ANTIBODY SCREEN: CPT

## 2019-12-18 PROCEDURE — 80048 BASIC METABOLIC PNL TOTAL CA: CPT

## 2019-12-18 PROCEDURE — C1713: CPT

## 2019-12-18 PROCEDURE — 86900 BLOOD TYPING SEROLOGIC ABO: CPT

## 2019-12-18 PROCEDURE — 76000 FLUOROSCOPY <1 HR PHYS/QHP: CPT

## 2019-12-18 PROCEDURE — 95940 IONM IN OPERATNG ROOM 15 MIN: CPT

## 2019-12-19 DIAGNOSIS — D72.828 OTHER ELEVATED WHITE BLOOD CELL COUNT: ICD-10-CM

## 2019-12-19 DIAGNOSIS — J06.9 ACUTE UPPER RESPIRATORY INFECTION, UNSPECIFIED: ICD-10-CM

## 2019-12-19 DIAGNOSIS — M48.07 SPINAL STENOSIS, LUMBOSACRAL REGION: ICD-10-CM

## 2019-12-19 DIAGNOSIS — M54.17 RADICULOPATHY, LUMBOSACRAL REGION: ICD-10-CM

## 2019-12-19 DIAGNOSIS — J98.11 ATELECTASIS: ICD-10-CM

## 2019-12-19 DIAGNOSIS — D62 ACUTE POSTHEMORRHAGIC ANEMIA: ICD-10-CM

## 2021-05-27 NOTE — PATIENT PROFILE ADULT - NSPROMUTINFOINDIVIDFT_GEN_A_NUR
[facility]    Patient ID: Maxim is a 39 year old male  : 1981  MRN: [id number]    Chief Compliant  Chief Complaint   Patient presents with   • Office Visit   • Follow-up     pt is here for 6 month f/u       HPI:  Patient is a 39 year old male who is here for routine checkup for his medical health.  He is under treatment for hypertension and diabetes and states he has been trying to monitor his weight and exercise.  Patient has no physical complaints at this time and states he feels fine;  he also has a history of hyperlipidemia and hyperuricemia and he is due for his blood chemistries.      Review of Systems   Constitutional: Negative for appetite change, chills, diaphoresis, fatigue and fever.   HENT: Negative.  Negative for congestion, ear pain, hearing loss, rhinorrhea, sneezing, tinnitus, trouble swallowing and voice change.    Eyes: Negative.  Negative for discharge, redness and visual disturbance.   Respiratory: Negative.  Negative for chest tightness, shortness of breath and wheezing.    Cardiovascular: Negative.  Negative for chest pain, palpitations and leg swelling.   Gastrointestinal: Negative.  Negative for abdominal distention, abdominal pain, blood in stool, diarrhea, nausea and vomiting.   Endocrine: Negative.    Genitourinary: Negative.  Negative for dysuria, frequency and urgency.   Musculoskeletal: Negative.  Negative for arthralgias, back pain, joint swelling and neck pain.   Skin: Negative.  Negative for color change.   Allergic/Immunologic: Negative.    Neurological: Negative.  Negative for dizziness, tremors, seizures, syncope and headaches.   Hematological: Negative.    Psychiatric/Behavioral: Negative.  Negative for agitation, confusion, hallucinations, sleep disturbance and suicidal ideas. The patient is not hyperactive.        Patient's medications, allergies, past medical, surgical, social and family histories were reviewed and updated as appropriate.  ALLERGIES:  No  Known Allergies   No family history on file.  Meds  Outpatient Current Medications as of as of 5/27/2021       Disp Refills Start End    hydrochlorothiazide (HYDRODIURIL) 25 MG tablet (Taking) 90 tablet 0 4/6/2021     Sig - Route: Take 1 tablet by mouth daily. - Oral    Class: Eprescribe    losartan (COZAAR) 100 MG tablet (Taking) 90 tablet 0 4/6/2021     Sig - Route: Take 1 tablet by mouth daily. - Oral    Class: Eprescribe    metFORMIN (GLUCOPHAGE) 500 MG tablet (Taking) 180 tablet 3 10/30/2020     Sig - Route: Take 1 tablet by mouth 2 times daily (with meals). - Oral    Class: Eprescribe    allopurinol (ZYLOPRIM) 100 MG tablet (Taking) 90 tablet 3 9/17/2020     Sig - Route: Take 1 tablet by mouth daily. - Oral    Class: Eprescribe    atorvastatin (LIPITOR) 10 MG tablet 90 tablet 0 10/16/2019     Sig: TAKE 1 TABLET BY MOUTH EVERY NIGHT AT BEDTIME    Class: Eprescribe        Social History     Socioeconomic History   • Marital status: Single     Spouse name: Not on file   • Number of children: Not on file   • Years of education: Not on file   • Highest education level: Not on file   Occupational History   • Not on file   Tobacco Use   • Smoking status: Never Smoker   • Smokeless tobacco: Never Used   Substance and Sexual Activity   • Alcohol use: Yes     Comment: SOCIALLY   • Drug use: Not on file   • Sexual activity: Not on file   Other Topics Concern   • Not on file   Social History Narrative   • Not on file     Social Determinants of Health     Financial Resource Strain:    • Social Determinants: Financial Resource Strain:    Food Insecurity:    • Social Determinants: Food Insecurity:    Transportation Needs:    • Lack of Transportation (Medical):    • Lack of Transportation (Non-Medical):    Physical Activity:    • Days of Exercise per Week:    • Minutes of Exercise per Session:    Stress:    • Social Determinants: Stress:    Social Connections:    • Social Determinants: Social Connections:    Intimate Partner  Violence:    • Social Determinants: Intimate Partner Violence Past Fear:    • Social Determinants: Intimate Partner Violence Current Fear:        Vitals  Visit Vitals  BP (!) 148/88 (BP Location: LUE - Left upper extremity, Patient Position: Sitting, Cuff Size: Large Adult)   Pulse 72   Temp 97.4 °F (36.3 °C) (Temporal)   Ht 5' 8\" (1.727 m)   Wt 128.4 kg (283 lb 1.1 oz)   SpO2 100%   BMI 43.04 kg/m²       Physical Exam   Constitutional: He is oriented to person, place, and time. He appears well-developed and well-nourished. No distress.   HENT:   Head: Normocephalic and atraumatic.   Nose: Nose normal.   Mouth/Throat: No oropharyngeal exudate.   Eyes: EOM are normal. Right eye exhibits no discharge. Left eye exhibits no discharge. No scleral icterus.   Neck: No JVD present. No tracheal deviation present. No thyromegaly present.   Cardiovascular: Normal rate, regular rhythm and intact distal pulses. Exam reveals no gallop and no friction rub.   No murmur heard.  Pulmonary/Chest: Effort normal and breath sounds normal. No stridor. No respiratory distress. He has no wheezes. He has no rales.   Abdominal: Soft. He exhibits no distension. There is no abdominal tenderness.   Musculoskeletal:         General: No tenderness, deformity or edema. Normal range of motion.      Cervical back: Neck supple.   Lymphadenopathy:     He has no cervical adenopathy.   Neurological: He is alert and oriented to person, place, and time. Coordination normal.   Skin: Skin is warm and dry. No rash noted. No erythema.   Psychiatric: He has a normal mood and affect. His behavior is normal. Judgment and thought content normal.   Nursing note and vitals reviewed.        Assessment:  1. Essential hypertension    2. Controlled type 2 diabetes mellitus without complication, without long-term current use of insulin (CMS/[facility])    3. Hyperuricemia    4. Hyperlipidemia, unspecified hyperlipidemia type    5. Obesity, morbid, BMI 40.0-49.9 (CMS/[facility])             Plan:  Problem List Items Addressed This Visit     None      Visit Diagnoses     Essential hypertension    -  Primary--continue with current antihypertensive medications    Relevant Orders    COMPREHENSIVE METABOLIC PANEL    Controlled type 2 diabetes mellitus without complication, without long-term current use of insulin (CMS/[facility])  --continue diabetic management and check glycohemoglobin as ordered    Relevant Orders    GLYCOHEMOGLOBIN    COMPREHENSIVE METABOLIC PANEL    MICROALBUMIN URINE RANDOM    Hyperuricemia  --check uric acid level as ordered    Relevant Orders    URIC ACID    Hyperlipidemia, unspecified hyperlipidemia type    --continue with statin therapy and check lipid panel    Relevant Orders    LIPID PANEL WITHOUT REFLEX    Obesity, morbid, BMI 40.0-49.9 (CMS/[facility])   --weight management was discussed and stressed with the patient.            Follow up Return in about 3 months (around 8/27/2021) for Routine follow-up, Check lab results.    Ej Patel MD       //

## 2021-09-13 PROBLEM — M54.16 RADICULOPATHY, LUMBAR REGION: Chronic | Status: ACTIVE | Noted: 2019-12-09

## 2021-09-19 PROBLEM — Z00.00 ENCOUNTER FOR PREVENTIVE HEALTH EXAMINATION: Status: ACTIVE | Noted: 2021-09-19

## 2021-09-29 ENCOUNTER — APPOINTMENT (OUTPATIENT)
Dept: SURGERY | Facility: CLINIC | Age: 41
End: 2021-09-29
Payer: MEDICAID

## 2021-09-29 VITALS
WEIGHT: 207 LBS | DIASTOLIC BLOOD PRESSURE: 80 MMHG | HEART RATE: 90 BPM | HEIGHT: 64 IN | BODY MASS INDEX: 35.34 KG/M2 | SYSTOLIC BLOOD PRESSURE: 121 MMHG | OXYGEN SATURATION: 97 %

## 2021-09-29 VITALS — TEMPERATURE: 97.7 F

## 2021-09-29 PROCEDURE — 99204 OFFICE O/P NEW MOD 45 MIN: CPT

## 2021-09-29 PROCEDURE — 99072 ADDL SUPL MATRL&STAF TM PHE: CPT

## 2021-09-30 NOTE — PHYSICAL EXAM
[Obese] : obese [Ulcers] : no ulcers [Normal] : affect appropriate [de-identified] : obese, soft, NT, ND, android distribution

## 2021-09-30 NOTE — HISTORY OF PRESENT ILLNESS
[de-identified] : 41 year M  with morbid obesity (BMI 35.5  kg/m2) presents for bariatric surgery consultation. The patient has tried multiple methods to lose weight in the past including pills, diets, exercise and portion control without any long term success. The patient has been obese for years. They experience dyspnea on exertion when walking down the street. They seek weight loss surgery for improvement of their activity level, health and comorbid conditions.\par

## 2021-11-10 ENCOUNTER — APPOINTMENT (OUTPATIENT)
Dept: SURGERY | Facility: CLINIC | Age: 41
End: 2021-11-10
Payer: MEDICAID

## 2021-11-10 VITALS
SYSTOLIC BLOOD PRESSURE: 115 MMHG | WEIGHT: 204 LBS | HEIGHT: 64 IN | OXYGEN SATURATION: 100 % | HEART RATE: 63 BPM | BODY MASS INDEX: 34.83 KG/M2 | DIASTOLIC BLOOD PRESSURE: 76 MMHG

## 2021-11-10 VITALS — TEMPERATURE: 97.6 F

## 2021-11-10 DIAGNOSIS — Z78.9 OTHER SPECIFIED HEALTH STATUS: ICD-10-CM

## 2021-11-10 DIAGNOSIS — G47.33 OBSTRUCTIVE SLEEP APNEA (ADULT) (PEDIATRIC): ICD-10-CM

## 2021-11-10 DIAGNOSIS — E66.01 MORBID (SEVERE) OBESITY DUE TO EXCESS CALORIES: ICD-10-CM

## 2021-11-10 DIAGNOSIS — K29.70 GASTRITIS, UNSPECIFIED, W/OUT BLEEDING: ICD-10-CM

## 2021-11-10 PROCEDURE — 99072 ADDL SUPL MATRL&STAF TM PHE: CPT

## 2021-11-10 PROCEDURE — 99213 OFFICE O/P EST LOW 20 MIN: CPT

## 2021-11-11 NOTE — HISTORY OF PRESENT ILLNESS
[de-identified] : YONG PHILLIPS is a 41 year old male who present in the office with morbid obesity (BMI  35 kg/m2)  for bariatric surgery consultation. Patient seek weight loss surgery for improvement of their activity level, health and comorbid conditions. Patient had sleep study with YASHIRA pending CPAP. He also  upper endoscopy and was positive for H pylori, received treatment and had an abdominal Ultrasound 2 month ago. He will obtain the records and fax it to the office .

## 2021-11-11 NOTE — PHYSICAL EXAM
[Obese] : obese [Normal] : affect appropriate [de-identified] : Normoactive bowel sounds, soft and nontender, no hepatosplenomegaly or masses noted

## 2021-11-11 NOTE — REASON FOR VISIT
[Follow-Up Visit] : a follow-up visit for [Morbid Obesity (BMI<40)] : morbid obesity (bmi<40) [FreeTextEntry2] : For Bariatric Surgery

## 2021-11-11 NOTE — CONSULT LETTER
[Dear  ___] : Dear  [unfilled], [Courtesy Letter:] : I had the pleasure of seeing your patient, [unfilled], in my office today. [Sincerely,] : Sincerely, [FreeTextEntry3] : Dr Dudley

## 2021-11-11 NOTE — ASSESSMENT
[FreeTextEntry1] : YONG PHILLIPS is a 41 year old male with morbid obesity (BMI  35 kg/m2) for Bariatric Surgery  Risks, benefits alternatives discussed. All questions answered. Risks discussed included: death, leak, stricture, DVT/PE, portomesenteric venous thrombosis, need for further procedures, tests or surgery, vitamin deficiency, gallstones, renal stones, inadequate weight loss, weight regain, need for open procedure, incisional hernia, bleeding, post-operative nausea/vomiting requiring hospitalization and intestinal obstruction.\par \par \par 1) Standard preoperative bariatric workup to include cardiology and pulmonology clearance, and preoperative fasting blood work. Patient stated that he had upper endoscopy and was positive for H pylori, received treatment and had an abdominal Ultrasound 2 month ago. He will obtain the records and fax it to the office\par 2) Patient will need to meet with psychologist and nutritionist for preoperative counseling regarding dietary goals, lifestyle changes, and postoperative expectations\par 3) We will assess the need for a medically-supervised diet, if required by the patient's insurer\par 4) Patient must attend 2 information session with bariatric coordinator and team\par 5) Patient should call insurance to ensure coverage for bariatric surgery\par 6) Bariatric Coordinator \par 7) Follow up 3 month \par 8) Patient wishes to have her weigh ins done in this office, today is first month of weigh ins

## 2022-11-17 NOTE — DISCHARGE NOTE NURSING/CASE MANAGEMENT/SOCIAL WORK - NSTRANSFERBELONGINGSDISPO_GEN_A_NUR
Final Anesthesia Post-op Assessment    Patient: Allen Miller  Procedure(s) Performed: LEFT ESWL WITH LEFT STENT INSERTION - LEFTX - LEFT  Anesthesia type: General    Vitals Value Taken Time   Temp 36.8 °C (98.2 °F) 11/17/22 1028   Pulse 85 11/17/22 1028   Resp 16 11/17/22 1028   SpO2 96 % 11/17/22 1028   /86 11/17/22 1028         Patient Location: Phase II  Post-op Vital Signs:stable  Level of Consciousness: awake  Respiratory Status: spontaneous ventilation  Cardiovascular stable  Hydration: euvolemic  Pain Management: well controlled  Vomiting: none  Nausea: None  Airway Patency:patent  Post-op Assessment: awake, alert, appropriately conversant, or baseline, no complications, patient tolerated procedure well with no complications, no evidence of recall, dentition within defined limits, moving all extremities and No Corneal Abrasion      There were no known complications for this encounter.   
given to family

## 2022-12-07 ENCOUNTER — APPOINTMENT (OUTPATIENT)
Dept: SURGERY | Facility: CLINIC | Age: 42
End: 2022-12-07

## 2023-05-15 VITALS — WEIGHT: 194 LBS | BODY MASS INDEX: 34.38 KG/M2 | HEIGHT: 63 IN

## 2023-06-16 VITALS — HEIGHT: 63 IN | BODY MASS INDEX: 35.3 KG/M2 | WEIGHT: 199.25 LBS

## 2023-07-15 VITALS — WEIGHT: 206.13 LBS | BODY MASS INDEX: 36.52 KG/M2 | HEIGHT: 63 IN

## 2023-08-17 ENCOUNTER — APPOINTMENT (OUTPATIENT)
Dept: BARIATRICS | Facility: CLINIC | Age: 43
End: 2023-08-17
Payer: COMMERCIAL

## 2023-08-17 VITALS
WEIGHT: 220 LBS | TEMPERATURE: 97.6 F | SYSTOLIC BLOOD PRESSURE: 120 MMHG | HEART RATE: 100 BPM | BODY MASS INDEX: 38.98 KG/M2 | DIASTOLIC BLOOD PRESSURE: 85 MMHG | OXYGEN SATURATION: 97 % | HEIGHT: 63 IN

## 2023-08-17 DIAGNOSIS — G47.30 SLEEP APNEA, UNSPECIFIED: ICD-10-CM

## 2023-08-17 DIAGNOSIS — Z80.8 FAMILY HISTORY OF MALIGNANT NEOPLASM OF OTHER ORGANS OR SYSTEMS: ICD-10-CM

## 2023-08-17 DIAGNOSIS — E66.01 MORBID (SEVERE) OBESITY DUE TO EXCESS CALORIES: ICD-10-CM

## 2023-08-17 DIAGNOSIS — M25.562 PAIN IN LEFT KNEE: ICD-10-CM

## 2023-08-17 DIAGNOSIS — K21.9 GASTRO-ESOPHAGEAL REFLUX DISEASE W/OUT ESOPHAGITIS: ICD-10-CM

## 2023-08-17 DIAGNOSIS — Z00.00 ENCOUNTER FOR GENERAL ADULT MEDICAL EXAMINATION W/OUT ABNORMAL FINDINGS: ICD-10-CM

## 2023-08-17 DIAGNOSIS — Z78.9 OTHER SPECIFIED HEALTH STATUS: ICD-10-CM

## 2023-08-17 PROCEDURE — 99205 OFFICE O/P NEW HI 60 MIN: CPT

## 2023-08-17 RX ORDER — DM/PSEUDOEPHED/ACETAMINOPH/CPM 15-30-325
TABLET ORAL
Refills: 0 | Status: ACTIVE | COMMUNITY

## 2023-08-22 ENCOUNTER — APPOINTMENT (OUTPATIENT)
Dept: BARIATRICS | Facility: CLINIC | Age: 43
End: 2023-08-22
Payer: COMMERCIAL

## 2023-08-22 PROCEDURE — 98968 PH1 ASSMT&MGMT NQHP 21-30: CPT

## 2023-09-19 ENCOUNTER — APPOINTMENT (OUTPATIENT)
Dept: BARIATRICS | Facility: CLINIC | Age: 43
End: 2023-09-19
Payer: COMMERCIAL

## 2023-09-19 PROCEDURE — 98967 PH1 ASSMT&MGMT NQHP 11-20: CPT

## 2023-09-20 ENCOUNTER — APPOINTMENT (OUTPATIENT)
Dept: PULMONOLOGY | Facility: CLINIC | Age: 43
End: 2023-09-20
Payer: COMMERCIAL

## 2023-09-20 VITALS
HEIGHT: 63 IN | DIASTOLIC BLOOD PRESSURE: 76 MMHG | BODY MASS INDEX: 38.98 KG/M2 | TEMPERATURE: 98.4 F | HEART RATE: 89 BPM | OXYGEN SATURATION: 96 % | WEIGHT: 220 LBS | SYSTOLIC BLOOD PRESSURE: 111 MMHG

## 2023-09-20 DIAGNOSIS — E66.01 MORBID (SEVERE) OBESITY DUE TO EXCESS CALORIES: ICD-10-CM

## 2023-09-20 DIAGNOSIS — Z01.818 ENCOUNTER FOR OTHER PREPROCEDURAL EXAMINATION: ICD-10-CM

## 2023-09-20 PROCEDURE — 99204 OFFICE O/P NEW MOD 45 MIN: CPT | Mod: 25

## 2023-09-20 PROCEDURE — 94060 EVALUATION OF WHEEZING: CPT | Mod: 26

## 2023-09-20 PROCEDURE — ZZZZZ: CPT

## 2023-09-20 PROCEDURE — 94727 GAS DIL/WSHOT DETER LNG VOL: CPT | Mod: 26

## 2023-09-20 PROCEDURE — 94729 DIFFUSING CAPACITY: CPT | Mod: 26

## 2023-10-03 DIAGNOSIS — E55.9 VITAMIN D DEFICIENCY, UNSPECIFIED: ICD-10-CM

## 2023-10-03 LAB
25(OH)D3 SERPL-MCNC: 15.4 NG/ML
A-TOCOPHEROL VIT E SERPL-MCNC: 9.6 MG/L
ALBUMIN SERPL ELPH-MCNC: 4.6 G/DL
ALP BLD-CCNC: 101 U/L
ALT SERPL-CCNC: 229 U/L
ANION GAP SERPL CALC-SCNC: 18 MMOL/L
APPEARANCE: CLEAR
AST SERPL-CCNC: 100 U/L
BACTERIA: NEGATIVE /HPF
BETA+GAMMA TOCOPHEROL SERPL-MCNC: 1.5 MG/L
BILIRUB SERPL-MCNC: 0.4 MG/DL
BILIRUBIN URINE: NEGATIVE
BLOOD URINE: NEGATIVE
BUN SERPL-MCNC: 21 MG/DL
CA-I SERPL-SCNC: 4.8 MG/DL
CALCIUM SERPL-MCNC: 9.2 MG/DL
CALCIUM SERPL-MCNC: 9.2 MG/DL
CAST: 0 /LPF
CHLORIDE SERPL-SCNC: 104 MMOL/L
CHOLEST SERPL-MCNC: 225 MG/DL
CO2 SERPL-SCNC: 18 MMOL/L
COLOR: YELLOW
CREAT SERPL-MCNC: 0.84 MG/DL
EGFR: 112 ML/MIN/1.73M2
EPITHELIAL CELLS: 0 /HPF
ESTIMATED AVERAGE GLUCOSE: 126 MG/DL
FOLATE SERPL-MCNC: 6 NG/ML
GLUCOSE QUALITATIVE U: NEGATIVE MG/DL
GLUCOSE SERPL-MCNC: 99 MG/DL
HBA1C MFR BLD HPLC: 6 %
HCG SERPL QL: NEGATIVE
HDLC SERPL-MCNC: 53 MG/DL
INR PPP: 1.19 RATIO
IRON SATN MFR SERPL: 17 %
IRON SERPL-MCNC: 50 UG/DL
KETONES URINE: NEGATIVE MG/DL
LDLC SERPL CALC-MCNC: 158 MG/DL
LEUKOCYTE ESTERASE URINE: NEGATIVE
MICROSCOPIC-UA: NORMAL
NITRITE URINE: NEGATIVE
NONHDLC SERPL-MCNC: 172 MG/DL
PAPP-A SERPL-ACNC: <1 MIU/ML
PARATHYROID HORMONE INTACT: 51 PG/ML
PH URINE: 5.5
POTASSIUM SERPL-SCNC: 5 MMOL/L
PREALB SERPL NEPH-MCNC: 20 MG/DL
PROT SERPL-MCNC: 7.8 G/DL
PROTEIN URINE: NEGATIVE MG/DL
PT BLD: 13.5 SEC
RED BLOOD CELLS URINE: 0 /HPF
REVIEW: NORMAL
SODIUM SERPL-SCNC: 141 MMOL/L
SPECIFIC GRAVITY URINE: 1.03
TIBC SERPL-MCNC: 298 UG/DL
TRIGL SERPL-MCNC: 77 MG/DL
TSH SERPL-ACNC: 2.16 UIU/ML
UIBC SERPL-MCNC: 248 UG/DL
UREA BREATH TEST QL: NEGATIVE
UROBILINOGEN URINE: 0.2 MG/DL
VIT A SERPL-MCNC: 35.1 UG/DL
VIT B1 SERPL-MCNC: 108.2 NMOL/L
VIT B12 SERPL-MCNC: 509 PG/ML
WHITE BLOOD CELLS URINE: 1 /HPF
ZINC SERPL-MCNC: 69 UG/DL

## 2023-10-03 RX ORDER — ERGOCALCIFEROL 1.25 MG/1
1.25 MG CAPSULE, LIQUID FILLED ORAL
Qty: 12 | Refills: 0 | Status: ACTIVE | COMMUNITY
Start: 2023-10-03 | End: 1900-01-01

## 2023-10-06 NOTE — PRE-ANESTHESIA EVALUATION ADULT - NSANTHOSAYNRD_GEN_A_CORE
No. YASHIRA screening performed.  STOP BANG Legend: 0-2 = LOW Risk; 3-4 = INTERMEDIATE Risk; 5-8 = HIGH Risk

## 2023-10-08 ENCOUNTER — TRANSCRIPTION ENCOUNTER (OUTPATIENT)
Age: 43
End: 2023-10-08

## 2023-10-09 ENCOUNTER — TRANSCRIPTION ENCOUNTER (OUTPATIENT)
Age: 43
End: 2023-10-09

## 2023-10-09 ENCOUNTER — OUTPATIENT (OUTPATIENT)
Dept: OUTPATIENT SERVICES | Facility: HOSPITAL | Age: 43
LOS: 1 days | Discharge: HOME | End: 2023-10-09
Payer: COMMERCIAL

## 2023-10-09 ENCOUNTER — RESULT REVIEW (OUTPATIENT)
Age: 43
End: 2023-10-09

## 2023-10-09 DIAGNOSIS — Z41.9 ENCOUNTER FOR PROCEDURE FOR PURPOSES OTHER THAN REMEDYING HEALTH STATE, UNSPECIFIED: Chronic | ICD-10-CM

## 2023-10-09 DIAGNOSIS — Z98.890 OTHER SPECIFIED POSTPROCEDURAL STATES: Chronic | ICD-10-CM

## 2023-10-09 PROCEDURE — 88305 TISSUE EXAM BY PATHOLOGIST: CPT

## 2023-10-09 PROCEDURE — 43239 EGD BIOPSY SINGLE/MULTIPLE: CPT

## 2023-10-09 PROCEDURE — 88305 TISSUE EXAM BY PATHOLOGIST: CPT | Mod: 26

## 2023-10-09 PROCEDURE — C1889: CPT

## 2023-10-09 PROCEDURE — 91035 G-ESOPH REFLX TST W/ELECTROD: CPT

## 2023-10-09 PROCEDURE — 91035 G-ESOPH REFLX TST W/ELECTROD: CPT | Mod: 26

## 2023-10-09 DEVICE — IMPLANTABLE DEVICE: Type: IMPLANTABLE DEVICE | Status: FUNCTIONAL

## 2023-10-16 LAB — SURGICAL PATHOLOGY STUDY: SIGNIFICANT CHANGE UP

## 2023-10-17 ENCOUNTER — APPOINTMENT (OUTPATIENT)
Dept: BARIATRICS | Facility: CLINIC | Age: 43
End: 2023-10-17
Payer: COMMERCIAL

## 2023-10-17 PROCEDURE — 98967 PH1 ASSMT&MGMT NQHP 11-20: CPT

## 2023-10-19 ENCOUNTER — APPOINTMENT (OUTPATIENT)
Dept: BARIATRICS | Facility: CLINIC | Age: 43
End: 2023-10-19
Payer: COMMERCIAL

## 2023-10-19 VITALS
OXYGEN SATURATION: 99 % | BODY MASS INDEX: 39.16 KG/M2 | TEMPERATURE: 98.1 F | SYSTOLIC BLOOD PRESSURE: 127 MMHG | WEIGHT: 221 LBS | HEIGHT: 63 IN | DIASTOLIC BLOOD PRESSURE: 86 MMHG | HEART RATE: 97 BPM

## 2023-10-19 PROCEDURE — 99214 OFFICE O/P EST MOD 30 MIN: CPT

## 2023-10-24 ENCOUNTER — TRANSCRIPTION ENCOUNTER (OUTPATIENT)
Age: 43
End: 2023-10-24

## 2023-10-24 VITALS
OXYGEN SATURATION: 96 % | SYSTOLIC BLOOD PRESSURE: 122 MMHG | WEIGHT: 221.79 LBS | RESPIRATION RATE: 16 BRPM | HEART RATE: 105 BPM | HEIGHT: 64 IN | DIASTOLIC BLOOD PRESSURE: 77 MMHG | TEMPERATURE: 98 F

## 2023-10-24 RX ORDER — INFLUENZA VIRUS VACCINE 15; 15; 15; 15 UG/.5ML; UG/.5ML; UG/.5ML; UG/.5ML
0.5 SUSPENSION INTRAMUSCULAR ONCE
Refills: 0 | Status: DISCONTINUED | OUTPATIENT
Start: 2023-10-25 | End: 2023-10-26

## 2023-10-24 RX ORDER — GABAPENTIN 400 MG/1
1 CAPSULE ORAL
Qty: 0 | Refills: 0 | DISCHARGE

## 2023-10-24 NOTE — PRE-OP CHECKLIST - TO WHOM
[Care Plan reviewed and provided to patient/caregiver] : Care plan reviewed and provided to patient/caregiver [FreeTextEntry3] : Continue balanced diet with all food groups. Brush teeth twice a day with toothbrush. Recommend visit to dentist. As per car seat 's guidelines, use forward-facing booster seat until child reaches highest weight/height for seat. Child needs to ride in a belt-positioning booster seat until  4 feet 9 inches has been reached and are between 8 and 12 years of age.  Put child to sleep in own bed. Help child to maintain consistent daily routines and sleep schedule. Pre-K discussed. Ensure home is safe. Teach child about personal safety. Use consistent, positive discipline. Read aloud to child. Limit screen time to no more than 2 hours per day.\par \par  maureen

## 2023-10-24 NOTE — PATIENT PROFILE ADULT - PATIENT/CAREGIVER OFFERED  INTERPRETER SERVICES
Implemented All Fall Risk Interventions:  [place] to call system. Call bell, personal items and telephone within reach. Instruct patient to call for assistance. Room bathroom lighting operational. Non-slip footwear when patient is off stretcher. Physically safe environment: no spills, clutter or unnecessary equipment. Stretcher in lowest position, wheels locked, appropriate side rails in place. Provide visual cue, wrist band, yellow gown, etc. Monitor gait and stability. Monitor for mental status changes and reorient to person, place, and time. Review medications for side effects contributing to fall risk. Reinforce activity limits and safety measures with patient and family.
yes

## 2023-10-24 NOTE — PRE-OP CHECKLIST - BP NONINVASIVE SYSTOLIC (MM HG)
Pt presents to ER for c/o altered mental status but reported at baseline for patient. Hx of anoxic brain injury. Pt fernandes catheter present and in place. Pt being treated for a UTI. Pt was seen in the ER for similar s/s. Pt has flexi seal in place. PEG tube present to abd. Skin warm, dry. Pt hooked to mechanical ventilator. Pt presents to ER for c/o desaturation on ventilator from [facility]. Abd soft, nontender. Pt oxygen saturation 100%. Bed locked, lowest position. Call light within easy reach. Side rails up x2.  Will continue to monitor.    122

## 2023-10-24 NOTE — PATIENT PROFILE ADULT - FALL HARM RISK - UNIVERSAL INTERVENTIONS
Bed in lowest position, wheels locked, appropriate side rails in place/Call bell, personal items and telephone in reach/Instruct patient to call for assistance before getting out of bed or chair/Non-slip footwear when patient is out of bed/[place] to call system/Physically safe environment - no spills, clutter or unnecessary equipment/Purposeful Proactive Rounding/Room/bathroom lighting operational, light cord in reach

## 2023-10-25 ENCOUNTER — TRANSCRIPTION ENCOUNTER (OUTPATIENT)
Age: 43
End: 2023-10-25

## 2023-10-25 ENCOUNTER — RESULT REVIEW (OUTPATIENT)
Age: 43
End: 2023-10-25

## 2023-10-25 ENCOUNTER — INPATIENT (INPATIENT)
Facility: HOSPITAL | Age: 43
LOS: 0 days | Discharge: HOME | DRG: 621 | End: 2023-10-26
Attending: GENERAL ACUTE CARE HOSPITAL | Admitting: GENERAL ACUTE CARE HOSPITAL
Payer: COMMERCIAL

## 2023-10-25 ENCOUNTER — APPOINTMENT (OUTPATIENT)
Dept: BARIATRICS | Facility: HOSPITAL | Age: 43
End: 2023-10-25
Payer: COMMERCIAL

## 2023-10-25 DIAGNOSIS — Z41.9 ENCOUNTER FOR PROCEDURE FOR PURPOSES OTHER THAN REMEDYING HEALTH STATE, UNSPECIFIED: Chronic | ICD-10-CM

## 2023-10-25 DIAGNOSIS — Z98.890 OTHER SPECIFIED POSTPROCEDURAL STATES: Chronic | ICD-10-CM

## 2023-10-25 LAB
BLD GP AB SCN SERPL QL: NEGATIVE — SIGNIFICANT CHANGE UP
BLD GP AB SCN SERPL QL: NEGATIVE — SIGNIFICANT CHANGE UP
HCT VFR BLD CALC: 40.8 % — SIGNIFICANT CHANGE UP (ref 39–50)
HCT VFR BLD CALC: 40.8 % — SIGNIFICANT CHANGE UP (ref 39–50)
HGB BLD-MCNC: 13.5 G/DL — SIGNIFICANT CHANGE UP (ref 13–17)
HGB BLD-MCNC: 13.5 G/DL — SIGNIFICANT CHANGE UP (ref 13–17)
MCHC RBC-ENTMCNC: 27.5 PG — SIGNIFICANT CHANGE UP (ref 27–34)
MCHC RBC-ENTMCNC: 27.5 PG — SIGNIFICANT CHANGE UP (ref 27–34)
MCHC RBC-ENTMCNC: 33.1 GM/DL — SIGNIFICANT CHANGE UP (ref 32–36)
MCHC RBC-ENTMCNC: 33.1 GM/DL — SIGNIFICANT CHANGE UP (ref 32–36)
MCV RBC AUTO: 83.1 FL — SIGNIFICANT CHANGE UP (ref 80–100)
MCV RBC AUTO: 83.1 FL — SIGNIFICANT CHANGE UP (ref 80–100)
NRBC # BLD: 0 /100 WBCS — SIGNIFICANT CHANGE UP (ref 0–0)
NRBC # BLD: 0 /100 WBCS — SIGNIFICANT CHANGE UP (ref 0–0)
PLATELET # BLD AUTO: 253 K/UL — SIGNIFICANT CHANGE UP (ref 150–400)
PLATELET # BLD AUTO: 253 K/UL — SIGNIFICANT CHANGE UP (ref 150–400)
RBC # BLD: 4.91 M/UL — SIGNIFICANT CHANGE UP (ref 4.2–5.8)
RBC # BLD: 4.91 M/UL — SIGNIFICANT CHANGE UP (ref 4.2–5.8)
RBC # FLD: 14.6 % — HIGH (ref 10.3–14.5)
RBC # FLD: 14.6 % — HIGH (ref 10.3–14.5)
RH IG SCN BLD-IMP: POSITIVE — SIGNIFICANT CHANGE UP
RH IG SCN BLD-IMP: POSITIVE — SIGNIFICANT CHANGE UP
WBC # BLD: 13.63 K/UL — HIGH (ref 3.8–10.5)
WBC # BLD: 13.63 K/UL — HIGH (ref 3.8–10.5)
WBC # FLD AUTO: 13.63 K/UL — HIGH (ref 3.8–10.5)
WBC # FLD AUTO: 13.63 K/UL — HIGH (ref 3.8–10.5)

## 2023-10-25 PROCEDURE — 43775 LAP SLEEVE GASTRECTOMY: CPT | Mod: AS

## 2023-10-25 PROCEDURE — 43235 EGD DIAGNOSTIC BRUSH WASH: CPT | Mod: 59

## 2023-10-25 PROCEDURE — 43282 LAP PARAESOPH HER RPR W/MESH: CPT | Mod: 59

## 2023-10-25 PROCEDURE — S2900 ROBOTIC SURGICAL SYSTEM: CPT | Mod: NC

## 2023-10-25 PROCEDURE — 43282 LAP PARAESOPH HER RPR W/MESH: CPT | Mod: AS,59

## 2023-10-25 PROCEDURE — 88307 TISSUE EXAM BY PATHOLOGIST: CPT | Mod: 26

## 2023-10-25 PROCEDURE — 43775 LAP SLEEVE GASTRECTOMY: CPT

## 2023-10-25 DEVICE — MESH PHASIX ST 7X10CM: Type: IMPLANTABLE DEVICE | Status: FUNCTIONAL

## 2023-10-25 DEVICE — XI STAPLER SUREFORM RELOAD 60 BLUE: Type: IMPLANTABLE DEVICE | Status: FUNCTIONAL

## 2023-10-25 RX ORDER — ACETAMINOPHEN 500 MG
1000 TABLET ORAL ONCE
Refills: 0 | Status: COMPLETED | OUTPATIENT
Start: 2023-10-25 | End: 2023-10-25

## 2023-10-25 RX ORDER — OXYCODONE HYDROCHLORIDE 5 MG/1
10 TABLET ORAL EVERY 4 HOURS
Refills: 0 | Status: DISCONTINUED | OUTPATIENT
Start: 2023-10-25 | End: 2023-10-26

## 2023-10-25 RX ORDER — ACETAMINOPHEN 500 MG
650 TABLET ORAL EVERY 6 HOURS
Refills: 0 | Status: DISCONTINUED | OUTPATIENT
Start: 2023-10-25 | End: 2023-10-25

## 2023-10-25 RX ORDER — ACETAMINOPHEN 500 MG
650 TABLET ORAL EVERY 6 HOURS
Refills: 0 | Status: DISCONTINUED | OUTPATIENT
Start: 2023-10-25 | End: 2023-10-26

## 2023-10-25 RX ORDER — SCOPALAMINE 1 MG/3D
1 PATCH, EXTENDED RELEASE TRANSDERMAL ONCE
Refills: 0 | Status: COMPLETED | OUTPATIENT
Start: 2023-10-25 | End: 2023-10-25

## 2023-10-25 RX ORDER — HYDROMORPHONE HYDROCHLORIDE 2 MG/ML
0.2 INJECTION INTRAMUSCULAR; INTRAVENOUS; SUBCUTANEOUS
Refills: 0 | Status: DISCONTINUED | OUTPATIENT
Start: 2023-10-25 | End: 2023-10-25

## 2023-10-25 RX ORDER — ONDANSETRON 8 MG/1
4 TABLET, FILM COATED ORAL EVERY 6 HOURS
Refills: 0 | Status: DISCONTINUED | OUTPATIENT
Start: 2023-10-25 | End: 2023-10-26

## 2023-10-25 RX ORDER — OXYCODONE HYDROCHLORIDE 5 MG/1
5 TABLET ORAL EVERY 4 HOURS
Refills: 0 | Status: DISCONTINUED | OUTPATIENT
Start: 2023-10-25 | End: 2023-10-26

## 2023-10-25 RX ORDER — SODIUM CHLORIDE 9 MG/ML
1000 INJECTION, SOLUTION INTRAVENOUS
Refills: 0 | Status: DISCONTINUED | OUTPATIENT
Start: 2023-10-25 | End: 2023-10-26

## 2023-10-25 RX ORDER — PANTOPRAZOLE SODIUM 20 MG/1
40 TABLET, DELAYED RELEASE ORAL DAILY
Refills: 0 | Status: DISCONTINUED | OUTPATIENT
Start: 2023-10-25 | End: 2023-10-26

## 2023-10-25 RX ORDER — ENOXAPARIN SODIUM 100 MG/ML
40 INJECTION SUBCUTANEOUS ONCE
Refills: 0 | Status: COMPLETED | OUTPATIENT
Start: 2023-10-25 | End: 2023-10-25

## 2023-10-25 RX ORDER — HYDROMORPHONE HYDROCHLORIDE 2 MG/ML
0.5 INJECTION INTRAMUSCULAR; INTRAVENOUS; SUBCUTANEOUS EVERY 6 HOURS
Refills: 0 | Status: DISCONTINUED | OUTPATIENT
Start: 2023-10-25 | End: 2023-10-26

## 2023-10-25 RX ORDER — THIAMINE MONONITRATE (VIT B1) 100 MG
500 TABLET ORAL DAILY
Refills: 0 | Status: DISCONTINUED | OUTPATIENT
Start: 2023-10-25 | End: 2023-10-26

## 2023-10-25 RX ORDER — KETOROLAC TROMETHAMINE 30 MG/ML
15 SYRINGE (ML) INJECTION EVERY 6 HOURS
Refills: 0 | Status: DISCONTINUED | OUTPATIENT
Start: 2023-10-25 | End: 2023-10-26

## 2023-10-25 RX ADMIN — Medication 650 MILLIGRAM(S): at 23:30

## 2023-10-25 RX ADMIN — Medication 650 MILLIGRAM(S): at 18:31

## 2023-10-25 RX ADMIN — Medication 1000 MILLIGRAM(S): at 17:06

## 2023-10-25 RX ADMIN — ONDANSETRON 4 MILLIGRAM(S): 8 TABLET, FILM COATED ORAL at 19:43

## 2023-10-25 RX ADMIN — Medication 650 MILLIGRAM(S): at 19:15

## 2023-10-25 RX ADMIN — Medication 400 MILLIGRAM(S): at 11:53

## 2023-10-25 RX ADMIN — OXYCODONE HYDROCHLORIDE 5 MILLIGRAM(S): 5 TABLET ORAL at 16:41

## 2023-10-25 RX ADMIN — Medication 1000 MILLIGRAM(S): at 07:17

## 2023-10-25 RX ADMIN — OXYCODONE HYDROCHLORIDE 5 MILLIGRAM(S): 5 TABLET ORAL at 18:17

## 2023-10-25 RX ADMIN — ENOXAPARIN SODIUM 40 MILLIGRAM(S): 100 INJECTION SUBCUTANEOUS at 07:17

## 2023-10-25 RX ADMIN — HYDROMORPHONE HYDROCHLORIDE 0.2 MILLIGRAM(S): 2 INJECTION INTRAMUSCULAR; INTRAVENOUS; SUBCUTANEOUS at 11:53

## 2023-10-25 RX ADMIN — SCOPALAMINE 1 PATCH: 1 PATCH, EXTENDED RELEASE TRANSDERMAL at 07:18

## 2023-10-25 RX ADMIN — HYDROMORPHONE HYDROCHLORIDE 0.2 MILLIGRAM(S): 2 INJECTION INTRAMUSCULAR; INTRAVENOUS; SUBCUTANEOUS at 11:38

## 2023-10-25 RX ADMIN — SCOPALAMINE 1 PATCH: 1 PATCH, EXTENDED RELEASE TRANSDERMAL at 19:20

## 2023-10-25 RX ADMIN — SODIUM CHLORIDE 150 MILLILITER(S): 9 INJECTION, SOLUTION INTRAVENOUS at 11:53

## 2023-10-25 RX ADMIN — PANTOPRAZOLE SODIUM 40 MILLIGRAM(S): 20 TABLET, DELAYED RELEASE ORAL at 11:39

## 2023-10-25 RX ADMIN — Medication 105 MILLIGRAM(S): at 18:31

## 2023-10-25 RX ADMIN — SODIUM CHLORIDE 150 MILLILITER(S): 9 INJECTION, SOLUTION INTRAVENOUS at 11:39

## 2023-10-25 RX ADMIN — Medication 15 MILLIGRAM(S): at 23:27

## 2023-10-25 NOTE — BRIEF OPERATIVE NOTE - NSICDXBRIEFPOSTOP_GEN_ALL_CORE_FT
POST-OP DIAGNOSIS:  Morbid obesity 25-Oct-2023 11:07:06  Atif Magana  Hiatal hernia 25-Oct-2023 11:07:14  Atif Magana  Ventral hernia 25-Oct-2023 11:07:27  Atif Magana

## 2023-10-25 NOTE — BRIEF OPERATIVE NOTE - NSICDXBRIEFPREOP_GEN_ALL_CORE_FT
PRE-OP DIAGNOSIS:  GERD (gastroesophageal reflux disease) 25-Oct-2023 11:07:02  Atif Magana  Morbid obesity 25-Oct-2023 11:06:52  Atif Magana

## 2023-10-25 NOTE — H&P ADULT - ASSESSMENT
43 year old male with morbid obesity, acid reflux symptoms, and YASHIRA presenting for elective sleeve gastrectomy. Will admit to monitored bed post-op on account of YASHIRA.

## 2023-10-25 NOTE — H&P ADULT - NSHPPHYSICALEXAM_GEN_ALL_CORE
awake, alert, calm, no acute distress  vision grossly intact  hearing grossly intact  neck supple  breathing unlabored  HR regular  abdomen soft, non-tender, no bulges or masses  BLE soft, non-edematous  mood c/w affect

## 2023-10-25 NOTE — H&P ADULT - HISTORY OF PRESENT ILLNESS
43 year old male with obesity (BMI 39), YASHIRA, and acid reflux who presents today for elective sleeve gastrectomy.  EGD did not reveal hiatal hernia.        From outpatient note by Dr. Heath Allen:     Patient is a 43-year-old M with a PMHx of obesity (BMI 39), YASHIRA, acid reflux daily and PSHx of lower back and shoulder surgery who presents today for final bariatric visit. In view of the result of his EGD and h/o acid reflux, discussed the risk of increased GERD if healthy diet and exercise not maintained. Patient understands the risk and wants to go forward with VSG. Explained the procedure of sleeve gastrectomy along with all the risks and benefits. Will schedule for VSG. Importance of a healthy lifestyle with healthy diet and exercise.

## 2023-10-25 NOTE — BRIEF OPERATIVE NOTE - OPERATION/FINDINGS
Sliding hiatal hernia repaired via mesh-reinforced cruroplasty  Ventral hernia, repaired primarily  Vertical sleeve gastrectomy over 40 F bougie

## 2023-10-25 NOTE — PROGRESS NOTE ADULT - SUBJECTIVE AND OBJECTIVE BOX
Procedure: Robot-assisted sleeve gastrectomy and hiatal hernia repair  Surgeon: Dr. Allen    S: Pt seen and examined bedside in the PACU. He has no acute complaints. He is still feeling sleepy from surgery and his pain is well controlled. He has not had anything to drink yet, but would like to try some water now. Denies CP, SOB, DUKE, calf tenderness or edema, nausea, emesis, or fevers.     O:  T(C): 36.6 (10-25-23 @ 11:16), Max: 36.6 (10-25-23 @ 11:16)  T(F): 97.8 (10-25-23 @ 11:16), Max: 97.8 (10-25-23 @ 11:16)  HR: 102 (10-25-23 @ 12:16) (97 - 102)  BP: 135/71 (10-25-23 @ 12:16) (118/58 - 137/66)  RR: 17 (10-25-23 @ 12:16) (16 - 20)  SpO2: 94% (10-25-23 @ 12:16) (93% - 96%)  Wt(kg): --      Gen: NAD, resting comfortably in bed  C/V: NSR  Pulm: Nonlabored breathing, no respiratory distress  Abd: soft, NT/ND Incision: 6 laparoscopic incisions are c/d/i  Extrem: WWP, no calf edema or tenderness, SCDs in place      A/P: 43 year old male with PMHx MO (BMI 38), YASHIRA, lumbar radiculopathy who presents for an elective sleeve gastrectomy. Now s/p robotic assisted sleeve gastrectomy and hiatal hernia repair with mesh (10/25).    Telemetry  BCLD/IVF  Pain/nausea control PRN  PPI  Thiamine  Dietician c/s  OOB/IS/SCDs  CBC 6hrs post-op (4pm 10/25)  Lovenox to start post-op day 1

## 2023-10-25 NOTE — H&P ADULT - NSICDXPASTSURGICALHX_GEN_ALL_CORE_FT
PAST SURGICAL HISTORY:  Elective surgery left shoulder surgery    H/O left knee surgery     Previous back surgery

## 2023-10-25 NOTE — BRIEF OPERATIVE NOTE - NSICDXBRIEFPROCEDURE_GEN_ALL_CORE_FT
PROCEDURES:  Robot-assisted sleeve gastrectomy using da Velma Xi 25-Oct-2023 11:06:12  Atif Magana  Repair, hiatal hernia, robot-assisted 25-Oct-2023 11:06:19  Atif Magana

## 2023-10-25 NOTE — PRE-ANESTHESIA EVALUATION ADULT - NSATTENDATTESTRD_GEN_ALL_CORE
The patient has been re-examined and I agree with the above assessment or I updated with my findings.
Her/She

## 2023-10-26 ENCOUNTER — TRANSCRIPTION ENCOUNTER (OUTPATIENT)
Age: 43
End: 2023-10-26

## 2023-10-26 VITALS — TEMPERATURE: 97 F

## 2023-10-26 LAB
ANION GAP SERPL CALC-SCNC: 8 MMOL/L — SIGNIFICANT CHANGE UP (ref 5–17)
ANION GAP SERPL CALC-SCNC: 8 MMOL/L — SIGNIFICANT CHANGE UP (ref 5–17)
BUN SERPL-MCNC: 13 MG/DL — SIGNIFICANT CHANGE UP (ref 7–23)
BUN SERPL-MCNC: 13 MG/DL — SIGNIFICANT CHANGE UP (ref 7–23)
CALCIUM SERPL-MCNC: 8.2 MG/DL — LOW (ref 8.4–10.5)
CALCIUM SERPL-MCNC: 8.2 MG/DL — LOW (ref 8.4–10.5)
CHLORIDE SERPL-SCNC: 105 MMOL/L — SIGNIFICANT CHANGE UP (ref 96–108)
CHLORIDE SERPL-SCNC: 105 MMOL/L — SIGNIFICANT CHANGE UP (ref 96–108)
CO2 SERPL-SCNC: 22 MMOL/L — SIGNIFICANT CHANGE UP (ref 22–31)
CO2 SERPL-SCNC: 22 MMOL/L — SIGNIFICANT CHANGE UP (ref 22–31)
CREAT SERPL-MCNC: 0.71 MG/DL — SIGNIFICANT CHANGE UP (ref 0.5–1.3)
CREAT SERPL-MCNC: 0.71 MG/DL — SIGNIFICANT CHANGE UP (ref 0.5–1.3)
EGFR: 117 ML/MIN/1.73M2 — SIGNIFICANT CHANGE UP
EGFR: 117 ML/MIN/1.73M2 — SIGNIFICANT CHANGE UP
GLUCOSE SERPL-MCNC: 118 MG/DL — HIGH (ref 70–99)
GLUCOSE SERPL-MCNC: 118 MG/DL — HIGH (ref 70–99)
HCT VFR BLD CALC: 37 % — LOW (ref 39–50)
HCT VFR BLD CALC: 37 % — LOW (ref 39–50)
HGB BLD-MCNC: 12.3 G/DL — LOW (ref 13–17)
HGB BLD-MCNC: 12.3 G/DL — LOW (ref 13–17)
MAGNESIUM SERPL-MCNC: 1.8 MG/DL — SIGNIFICANT CHANGE UP (ref 1.6–2.6)
MAGNESIUM SERPL-MCNC: 1.8 MG/DL — SIGNIFICANT CHANGE UP (ref 1.6–2.6)
MCHC RBC-ENTMCNC: 27.8 PG — SIGNIFICANT CHANGE UP (ref 27–34)
MCHC RBC-ENTMCNC: 27.8 PG — SIGNIFICANT CHANGE UP (ref 27–34)
MCHC RBC-ENTMCNC: 33.2 GM/DL — SIGNIFICANT CHANGE UP (ref 32–36)
MCHC RBC-ENTMCNC: 33.2 GM/DL — SIGNIFICANT CHANGE UP (ref 32–36)
MCV RBC AUTO: 83.7 FL — SIGNIFICANT CHANGE UP (ref 80–100)
MCV RBC AUTO: 83.7 FL — SIGNIFICANT CHANGE UP (ref 80–100)
NRBC # BLD: 0 /100 WBCS — SIGNIFICANT CHANGE UP (ref 0–0)
NRBC # BLD: 0 /100 WBCS — SIGNIFICANT CHANGE UP (ref 0–0)
PHOSPHATE SERPL-MCNC: 2.1 MG/DL — LOW (ref 2.5–4.5)
PHOSPHATE SERPL-MCNC: 2.1 MG/DL — LOW (ref 2.5–4.5)
PLATELET # BLD AUTO: 220 K/UL — SIGNIFICANT CHANGE UP (ref 150–400)
PLATELET # BLD AUTO: 220 K/UL — SIGNIFICANT CHANGE UP (ref 150–400)
POTASSIUM SERPL-MCNC: 4.1 MMOL/L — SIGNIFICANT CHANGE UP (ref 3.5–5.3)
POTASSIUM SERPL-MCNC: 4.1 MMOL/L — SIGNIFICANT CHANGE UP (ref 3.5–5.3)
POTASSIUM SERPL-SCNC: 4.1 MMOL/L — SIGNIFICANT CHANGE UP (ref 3.5–5.3)
POTASSIUM SERPL-SCNC: 4.1 MMOL/L — SIGNIFICANT CHANGE UP (ref 3.5–5.3)
RBC # BLD: 4.42 M/UL — SIGNIFICANT CHANGE UP (ref 4.2–5.8)
RBC # BLD: 4.42 M/UL — SIGNIFICANT CHANGE UP (ref 4.2–5.8)
RBC # FLD: 14.5 % — SIGNIFICANT CHANGE UP (ref 10.3–14.5)
RBC # FLD: 14.5 % — SIGNIFICANT CHANGE UP (ref 10.3–14.5)
SODIUM SERPL-SCNC: 135 MMOL/L — SIGNIFICANT CHANGE UP (ref 135–145)
SODIUM SERPL-SCNC: 135 MMOL/L — SIGNIFICANT CHANGE UP (ref 135–145)
WBC # BLD: 10.12 K/UL — SIGNIFICANT CHANGE UP (ref 3.8–10.5)
WBC # BLD: 10.12 K/UL — SIGNIFICANT CHANGE UP (ref 3.8–10.5)
WBC # FLD AUTO: 10.12 K/UL — SIGNIFICANT CHANGE UP (ref 3.8–10.5)
WBC # FLD AUTO: 10.12 K/UL — SIGNIFICANT CHANGE UP (ref 3.8–10.5)

## 2023-10-26 PROCEDURE — S2900: CPT

## 2023-10-26 PROCEDURE — C9399: CPT

## 2023-10-26 PROCEDURE — C1781: CPT

## 2023-10-26 PROCEDURE — 36415 COLL VENOUS BLD VENIPUNCTURE: CPT

## 2023-10-26 PROCEDURE — 94660 CPAP INITIATION&MGMT: CPT

## 2023-10-26 PROCEDURE — 86901 BLOOD TYPING SEROLOGIC RH(D): CPT

## 2023-10-26 PROCEDURE — 85027 COMPLETE CBC AUTOMATED: CPT

## 2023-10-26 PROCEDURE — 86900 BLOOD TYPING SEROLOGIC ABO: CPT

## 2023-10-26 PROCEDURE — 80048 BASIC METABOLIC PNL TOTAL CA: CPT

## 2023-10-26 PROCEDURE — 86850 RBC ANTIBODY SCREEN: CPT

## 2023-10-26 PROCEDURE — C1889: CPT

## 2023-10-26 PROCEDURE — 83735 ASSAY OF MAGNESIUM: CPT

## 2023-10-26 PROCEDURE — 88307 TISSUE EXAM BY PATHOLOGIST: CPT

## 2023-10-26 PROCEDURE — P9045: CPT

## 2023-10-26 PROCEDURE — 84100 ASSAY OF PHOSPHORUS: CPT

## 2023-10-26 RX ORDER — OMEPRAZOLE 10 MG/1
1 CAPSULE, DELAYED RELEASE ORAL
Qty: 30 | Refills: 0
Start: 2023-10-26 | End: 2023-11-24

## 2023-10-26 RX ORDER — ACETAMINOPHEN 500 MG
20.3 TABLET ORAL
Qty: 324.8 | Refills: 0
Start: 2023-10-26 | End: 2023-10-29

## 2023-10-26 RX ORDER — OXYCODONE HYDROCHLORIDE 5 MG/1
5 TABLET ORAL
Qty: 80 | Refills: 0
Start: 2023-10-26 | End: 2023-10-29

## 2023-10-26 RX ORDER — SODIUM,POTASSIUM PHOSPHATES 278-250MG
1 POWDER IN PACKET (EA) ORAL ONCE
Refills: 0 | Status: COMPLETED | OUTPATIENT
Start: 2023-10-26 | End: 2023-10-26

## 2023-10-26 RX ORDER — PANTOPRAZOLE SODIUM 20 MG/1
1 TABLET, DELAYED RELEASE ORAL
Qty: 30 | Refills: 0
Start: 2023-10-26 | End: 2023-11-24

## 2023-10-26 RX ADMIN — Medication 650 MILLIGRAM(S): at 12:07

## 2023-10-26 RX ADMIN — Medication 650 MILLIGRAM(S): at 00:00

## 2023-10-26 RX ADMIN — Medication 1 PACKET(S): at 12:11

## 2023-10-26 RX ADMIN — Medication 15 MILLIGRAM(S): at 11:38

## 2023-10-26 RX ADMIN — Medication 15 MILLIGRAM(S): at 12:00

## 2023-10-26 RX ADMIN — Medication 650 MILLIGRAM(S): at 05:39

## 2023-10-26 RX ADMIN — Medication 650 MILLIGRAM(S): at 12:43

## 2023-10-26 RX ADMIN — SODIUM CHLORIDE 150 MILLILITER(S): 9 INJECTION, SOLUTION INTRAVENOUS at 05:39

## 2023-10-26 RX ADMIN — Medication 105 MILLIGRAM(S): at 11:39

## 2023-10-26 RX ADMIN — PANTOPRAZOLE SODIUM 40 MILLIGRAM(S): 20 TABLET, DELAYED RELEASE ORAL at 11:39

## 2023-10-26 RX ADMIN — Medication 15 MILLIGRAM(S): at 00:00

## 2023-10-26 RX ADMIN — ONDANSETRON 4 MILLIGRAM(S): 8 TABLET, FILM COATED ORAL at 10:18

## 2023-10-26 RX ADMIN — Medication 15 MILLIGRAM(S): at 05:39

## 2023-10-26 NOTE — PROGRESS NOTE ADULT - SUBJECTIVE AND OBJECTIVE BOX
INTERVAL HPI/OVERNIGHT EVENTS: CBC H/H 13.5/40 wnl, PTOV    STATUS POST: RA sleeve gastrectomy and HHR w/ mesh     POST OPERATIVE DAY #: 1    SUBJECTIVE:      MEDICATIONS  (STANDING):  acetaminophen   Oral Liquid .. 650 milliGRAM(s) Oral every 6 hours  influenza   Vaccine 0.5 milliLiter(s) IntraMuscular once  ketorolac   Injectable 15 milliGRAM(s) IV Push every 6 hours  lactated ringers. 1000 milliLiter(s) (150 mL/Hr) IV Continuous <Continuous>  pantoprazole  Injectable 40 milliGRAM(s) IV Push daily  thiamine IVPB 500 milliGRAM(s) IV Intermittent daily    MEDICATIONS  (PRN):  HYDROmorphone  Injectable 0.5 milliGRAM(s) IV Push every 6 hours PRN Breakthrough pain  ondansetron Injectable 4 milliGRAM(s) IV Push every 6 hours PRN Nausea  oxyCODONE    Solution 5 milliGRAM(s) Oral every 4 hours PRN Moderate Pain (4 - 6)  oxyCODONE    Solution 10 milliGRAM(s) Oral every 4 hours PRN Severe Pain (7 - 10)      Vital Signs Last 24 Hrs  T(C): 36.9 (26 Oct 2023 05:00), Max: 37.2 (25 Oct 2023 22:19)  T(F): 98.5 (26 Oct 2023 05:00), Max: 99 (25 Oct 2023 22:19)  HR: 106 (26 Oct 2023 06:10) (88 - 106)  BP: 105/67 (26 Oct 2023 04:19) (105/67 - 137/78)  BP(mean): 82 (26 Oct 2023 04:19) (76 - 102)  RR: 20 (26 Oct 2023 04:19) (16 - 25)  SpO2: 92% (26 Oct 2023 06:10) (91% - 97%)    Parameters below as of 26 Oct 2023 04:19  Patient On (Oxygen Delivery Method): BiPAP/CPAP    O2 Concentration (%): 30    PHYSICAL EXAM:      Constitutional: A&Ox3    Respiratory: non labored breathing, no respiratory distress    Cardiovascular: NSR, RRR    Gastrointestinal: soft, nondistended, appropriate incisional tenderness, incisions c/d/i    Extremities: (-) edema                  I&O's Detail    25 Oct 2023 07:01  -  26 Oct 2023 06:11  --------------------------------------------------------  IN:    Lactated Ringers: 1500 mL  Total IN: 1500 mL    OUT:    Voided (mL): 1050 mL  Total OUT: 1050 mL    Total NET: 450 mL          LABS:                        13.5   13.63 )-----------( 253      ( 25 Oct 2023 18:16 )             40.8                 RADIOLOGY & ADDITIONAL STUDIES: INTERVAL HPI/OVERNIGHT EVENTS: CBC H/H 13.5/40 wnl, PTOV    STATUS POST: RA sleeve gastrectomy and HHR w/ mesh     POST OPERATIVE DAY #: 1    SUBJECTIVE:  pt seen at bedside, feeling good. denies nausea/vomiting. tolerating liquids, ambulating. denies abdominal pain    MEDICATIONS  (STANDING):  acetaminophen   Oral Liquid .. 650 milliGRAM(s) Oral every 6 hours  influenza   Vaccine 0.5 milliLiter(s) IntraMuscular once  ketorolac   Injectable 15 milliGRAM(s) IV Push every 6 hours  lactated ringers. 1000 milliLiter(s) (150 mL/Hr) IV Continuous <Continuous>  pantoprazole  Injectable 40 milliGRAM(s) IV Push daily  thiamine IVPB 500 milliGRAM(s) IV Intermittent daily    MEDICATIONS  (PRN):  HYDROmorphone  Injectable 0.5 milliGRAM(s) IV Push every 6 hours PRN Breakthrough pain  ondansetron Injectable 4 milliGRAM(s) IV Push every 6 hours PRN Nausea  oxyCODONE    Solution 5 milliGRAM(s) Oral every 4 hours PRN Moderate Pain (4 - 6)  oxyCODONE    Solution 10 milliGRAM(s) Oral every 4 hours PRN Severe Pain (7 - 10)      Vital Signs Last 24 Hrs  T(C): 36.9 (26 Oct 2023 05:00), Max: 37.2 (25 Oct 2023 22:19)  T(F): 98.5 (26 Oct 2023 05:00), Max: 99 (25 Oct 2023 22:19)  HR: 106 (26 Oct 2023 06:10) (88 - 106)  BP: 105/67 (26 Oct 2023 04:19) (105/67 - 137/78)  BP(mean): 82 (26 Oct 2023 04:19) (76 - 102)  RR: 20 (26 Oct 2023 04:19) (16 - 25)  SpO2: 92% (26 Oct 2023 06:10) (91% - 97%)    Parameters below as of 26 Oct 2023 04:19  Patient On (Oxygen Delivery Method): BiPAP/CPAP    O2 Concentration (%): 30    PHYSICAL EXAM:      Constitutional: A&Ox3    Respiratory: non labored breathing, no respiratory distress    Cardiovascular: NSR, RRR    Gastrointestinal: soft, nondistended, appropriate incisional tenderness, incisions c/d/i    Extremities: (-) edema                  I&O's Detail    25 Oct 2023 07:01  -  26 Oct 2023 06:11  --------------------------------------------------------  IN:    Lactated Ringers: 1500 mL  Total IN: 1500 mL    OUT:    Voided (mL): 1050 mL  Total OUT: 1050 mL    Total NET: 450 mL          LABS:                        13.5   13.63 )-----------( 253      ( 25 Oct 2023 18:16 )             40.8                 RADIOLOGY & ADDITIONAL STUDIES:

## 2023-10-26 NOTE — DIETITIAN INITIAL EVALUATION ADULT - OTHER INFO
43-year-old male with PMHx MO (BMI 38), YASHIRA (not on CPAP), lumbar radiculopathy who presents for an elective sleeve gastrectomy. Now s/p robotic assisted sleeve gastrectomy and hiatal hernia repair with mesh (10/25). MAPs trending >65 mm Hg @ . Tmax 37.5 C. Chart, labs and meds reviewed. Labs significant for H/H 123/37.0L; glucose 118H; phosphorus 2.1L

## 2023-10-26 NOTE — PROGRESS NOTE ADULT - ASSESSMENT
43 year old male with PMHx MO (BMI 38), YASHIRA (not on CPAP), lumbar radiculopathy who presents for an elective sleeve gastrectomy. Now s/p robotic assisted sleeve gastrectomy and hiatal hernia repair with mesh (10/25).    BCLD/IVF  Pain/nausea control PRN  PPI  Thiamine  Dietician c/s  OOB/IS/SCDs  CBC 6hrs post-op (4pm 10/25)  Lovenox to start post-op day 1

## 2023-10-26 NOTE — DIETITIAN INITIAL EVALUATION ADULT - ADD RECOMMEND
1. Bariatric Clear Liquids diet   2. Recommend advance to phase 1 bariatric full liquid diet when medically feasible   3. Encourage adequate hydration with goal of 4oz/hr and/or 64 oz/day   4. Monitor BMP, BG, POCT, lytes, replete prn  - Of note, patient with history of hypophosphatemia; please continue to monitor and replete as needed

## 2023-10-26 NOTE — DISCHARGE NOTE PROVIDER - NSDCCPTREATMENT_GEN_ALL_CORE_FT
PRINCIPAL PROCEDURE  Procedure: Robot-assisted sleeve gastrectomy using da Velma Xi  Findings and Treatment:       SECONDARY PROCEDURE  Procedure: Repair, hiatal hernia, robot-assisted  Findings and Treatment:

## 2023-10-26 NOTE — DIETITIAN INITIAL EVALUATION ADULT - PERSON TAUGHT/METHOD
RD Discussed volumes of various cup sizes on tray table and encouraged aiming for 4 oz/hr as tolerated. Pt is prepared with protein shakes, with plan to get vitamins after discharge. RD provided in-depth education on diet advancement and specific nutrient needs s/p gastric sleeve. Pt appears receptive, verbalized understanding. Written nutrition education handouts provided./patient instructed

## 2023-10-26 NOTE — DISCHARGE NOTE NURSING/CASE MANAGEMENT/SOCIAL WORK - PATIENT PORTAL LINK FT
You can access the FollowMyHealth Patient Portal offered by [facility] by registering at the following website: [email or web address]. By joining [employer]’s FollowMyHealth portal, you will also be able to view your health information using other applications (apps) compatible with our system.

## 2023-10-26 NOTE — DIETITIAN INITIAL EVALUATION ADULT - PERTINENT MEDS FT
MEDICATIONS  (STANDING):  acetaminophen   Oral Liquid .. 650 milliGRAM(s) Oral every 6 hours  influenza   Vaccine 0.5 milliLiter(s) IntraMuscular once  ketorolac   Injectable 15 milliGRAM(s) IV Push every 6 hours  lactated ringers. 1000 milliLiter(s) (150 mL/Hr) IV Continuous <Continuous>  pantoprazole  Injectable 40 milliGRAM(s) IV Push daily  potassium phosphate / sodium phosphate Powder (PHOS-NaK) 1 Packet(s) Oral once  thiamine IVPB 500 milliGRAM(s) IV Intermittent daily    MEDICATIONS  (PRN):  HYDROmorphone  Injectable 0.5 milliGRAM(s) IV Push every 6 hours PRN Breakthrough pain  ondansetron Injectable 4 milliGRAM(s) IV Push every 6 hours PRN Nausea  oxyCODONE    Solution 5 milliGRAM(s) Oral every 4 hours PRN Moderate Pain (4 - 6)  oxyCODONE    Solution 10 milliGRAM(s) Oral every 4 hours PRN Severe Pain (7 - 10)

## 2023-10-26 NOTE — DISCHARGE NOTE PROVIDER - CARE PROVIDER_API CALL
Heath Allen  Surgery  [address] [place] [id number]  Phone: [phone]  Fax: [phone]  Established Patient  Follow Up Time:

## 2023-10-26 NOTE — DIETITIAN INITIAL EVALUATION ADULT - PERTINENT LABORATORY DATA
10-26    135  |  105  |  13  ----------------------------<  118<H>  4.1   |  22  |  0.71    Ca    8.2<L>      26 Oct 2023 05:30  Phos  2.1     10-26  Mg     1.8     10-26

## 2023-10-26 NOTE — DISCHARGE NOTE PROVIDER - NSDCFUADDINST_GEN_ALL_CORE_FT
Follow up with Dr. Allen in 1-2 weeks. Call the office at  to schedule your appointment. You may shower; soap and water over incision sites. Do not scrub. Pat dry when done. No tub bathing or swimming until cleared. Keep incision sites out of the sun as scars will darken. No heavy lifting (>10lbs) or strenuous exercise. Diet: Bariatric Full Fluids. 60 grams protein daily.  64 fluid ounces water daily. Drink small sips throughout the day. Continue diet as outlined by paperwork received as a pre-operative patient. You should be urinating at least 3-4x per day. Call the office if you experience increasing abdominal pain, nausea, vomiting, or temperature >100.4F.  NO ASPIRIN OR NSAIDs until approved by Dr. Allen. Avoid alcoholic beverages until cleared by Dr. Allen.           1) Please take Tylenol 650 mg every 4 to 6 hours by mouth for moderate pain control. Please do not exceed over 4,000 mg of Tylenol a day.     2) Please start taking oxycodone 5mg every 4 to 6 hours by mouth for severe pain control.     3) Please take Omeprazole 40 mg once a day by mouth for 1 month.

## 2023-10-26 NOTE — DIETITIAN INITIAL EVALUATION ADULT - ORAL INTAKE PTA/DIET HISTORY
Pt seen on 8 Lachman at bedside. On assessment, pt resting in bed. Utilized interpretation services of Yudi Martinez. Currently on Bariatric Clear Liquids (BARICLLIQ) diet, tolerating PO. Pt had sips of water out of the clear, 30cc cups. Pain and nausea well controlled. Discussed volumes of various cup sizes on tray table and encouraged aiming for 4 oz/hr as tolerated. Prepared with protein shakes at home. RD provided in-depth education on diet advancement and specific nutrient needs status-post blank. No known food allergies. No dietary restrictions at home. RD observed pt with no overt signs of muscle or fat wasting. Based on ASPEN guidelines, pt does not meet criteria for malnutrition at this time. RD to follow up per protocol.

## 2023-10-26 NOTE — DIETITIAN INITIAL EVALUATION ADULT - ETIOLOGY
RT need for educational review on the diet advancement process and specific nutrient needs s/p gastric sleeve

## 2023-10-26 NOTE — DIETITIAN INITIAL EVALUATION ADULT - BODY MASS INDEX
Christina Roland MD office called to see if it was ok if the pt can take Mitigar 0.6 PRN form a heart stand point.    Office #: [phone] ext # [phone]

## 2023-10-26 NOTE — DISCHARGE NOTE PROVIDER - NSDCMRMEDTOKEN_GEN_ALL_CORE_FT
acetaminophen 325 mg/10.15 mL oral liquid: 20.3 milliliter(s) orally every 6 hours as needed for  mild pain MDD: 4000 mgs  oxyCODONE 5 mg/5 mL oral solution: 5 milliliter(s) orally every 6 hours as needed for  severe pain MDD: 20mls  pantoprazole 40 mg oral granule, delayed release: 1 each orally once a day MDD: 1 pack

## 2023-10-26 NOTE — DISCHARGE NOTE PROVIDER - NSDCFUSCHEDAPPT_GEN_ALL_CORE_FT
Heath Allen  [facility]  [facility] [address]  Scheduled Appointment: 11/09/2023

## 2023-10-26 NOTE — DISCHARGE NOTE NURSING/CASE MANAGEMENT/SOCIAL WORK - NSDCPEFALRISK_GEN_ALL_CORE
For information on Fall & Injury Prevention, visit: [email or web address] OR  [email or web address] OR  https://www.cdc.gov/steadi/patient.html

## 2023-10-26 NOTE — DISCHARGE NOTE PROVIDER - HOSPITAL COURSE
43 year old male with PMHx MO (BMI 38), YASHIRA (not on CPAP), lumbar radiculopathy who presents for an elective sleeve gastrectomy. Now s/p robotic assisted sleeve gastrectomy and hiatal hernia repair with mesh (10/25). Diet was advanced to bariatric clear liquids. At time of discharge the patient was tolerating an adequate amount of PO intake and was stable and ready for discharge with follow-up as outpatient.     You may shower but NO baths and NO swimming. Keep incisions clean & dry.     Do not remove Steri-strips; they will fall off on their own after a few showers.      No heavy lifting >20 pounds or strenuous exercise.      Diet: Bariatric Full Fluids, 60 grams protein daily and 64 fluid ounces water daily. Drink small sips throughout the day to avoid falling behind.     Call your doctor if you experience increased abdominal pain, nausea, vomiting, fever >101.4F, or chills     Follow up in 2 weeks in Dr. Allen's office.     NO ASPRIN OR NSAIDs till approved by Dr. Allen.      Resume home medications.     If lightheaded/dizzy hold medications. Follow up with your PCP/internist in 3-4 weeks to review medications.

## 2023-10-26 NOTE — DIETITIAN INITIAL EVALUATION ADULT - OTHER CALCULATIONS
HT (inches): 64       WT (pounds): 221.8 (10/25)       BMI (kg/m^2): 38.1       IBW (pounds): 130 +/- 10%       %IBW: 170.6 %  Above energy needs calculated for wt maintenance (20-25kcal/kg) using ideal body weight.   Weeks 1-2 estimated needs: kcal/day (10-12kcal/kg), g pro/day (1.5-2.1g/kg), >/=64oz clear fluids.

## 2023-10-30 LAB
SURGICAL PATHOLOGY STUDY: SIGNIFICANT CHANGE UP
SURGICAL PATHOLOGY STUDY: SIGNIFICANT CHANGE UP

## 2023-11-01 DIAGNOSIS — K44.9 DIAPHRAGMATIC HERNIA WITHOUT OBSTRUCTION OR GANGRENE: ICD-10-CM

## 2023-11-01 DIAGNOSIS — M54.16 RADICULOPATHY, LUMBAR REGION: ICD-10-CM

## 2023-11-01 DIAGNOSIS — K43.9 VENTRAL HERNIA WITHOUT OBSTRUCTION OR GANGRENE: ICD-10-CM

## 2023-11-01 DIAGNOSIS — E66.8 OTHER OBESITY: ICD-10-CM

## 2023-11-01 DIAGNOSIS — E66.9 OBESITY, UNSPECIFIED: ICD-10-CM

## 2023-11-01 DIAGNOSIS — K21.9 GASTRO-ESOPHAGEAL REFLUX DISEASE WITHOUT ESOPHAGITIS: ICD-10-CM

## 2023-11-01 DIAGNOSIS — G47.33 OBSTRUCTIVE SLEEP APNEA (ADULT) (PEDIATRIC): ICD-10-CM

## 2023-11-09 ENCOUNTER — APPOINTMENT (OUTPATIENT)
Dept: BARIATRICS | Facility: CLINIC | Age: 43
End: 2023-11-09
Payer: COMMERCIAL

## 2023-11-09 VITALS
HEART RATE: 117 BPM | WEIGHT: 202.44 LBS | BODY MASS INDEX: 35.87 KG/M2 | OXYGEN SATURATION: 96 % | DIASTOLIC BLOOD PRESSURE: 83 MMHG | SYSTOLIC BLOOD PRESSURE: 133 MMHG | TEMPERATURE: 98.1 F | HEIGHT: 63 IN

## 2023-11-09 PROBLEM — G47.33 OBSTRUCTIVE SLEEP APNEA (ADULT) (PEDIATRIC): Chronic | Status: ACTIVE | Noted: 2023-10-24

## 2023-11-09 PROBLEM — E66.9 OBESITY, UNSPECIFIED: Chronic | Status: ACTIVE | Noted: 2023-10-24

## 2023-11-09 PROCEDURE — 99024 POSTOP FOLLOW-UP VISIT: CPT

## 2023-11-21 ENCOUNTER — NON-APPOINTMENT (OUTPATIENT)
Age: 43
End: 2023-11-21

## 2023-11-30 ENCOUNTER — APPOINTMENT (OUTPATIENT)
Dept: BARIATRICS | Facility: CLINIC | Age: 43
End: 2023-11-30
Payer: COMMERCIAL

## 2023-11-30 VITALS
HEIGHT: 63 IN | SYSTOLIC BLOOD PRESSURE: 120 MMHG | BODY MASS INDEX: 34.25 KG/M2 | HEART RATE: 89 BPM | OXYGEN SATURATION: 98 % | WEIGHT: 193.31 LBS | TEMPERATURE: 98.1 F | DIASTOLIC BLOOD PRESSURE: 78 MMHG

## 2023-11-30 DIAGNOSIS — Z98.84 BARIATRIC SURGERY STATUS: ICD-10-CM

## 2023-11-30 PROCEDURE — 99024 POSTOP FOLLOW-UP VISIT: CPT

## 2023-12-04 LAB
ALBUMIN SERPL ELPH-MCNC: 4.4 G/DL
ALP BLD-CCNC: 108 U/L
ALT SERPL-CCNC: 196 U/L
ANION GAP SERPL CALC-SCNC: 10 MMOL/L
AST SERPL-CCNC: 69 U/L
BILIRUB SERPL-MCNC: 0.3 MG/DL
BUN SERPL-MCNC: 21 MG/DL
CALCIUM SERPL-MCNC: 8.9 MG/DL
CHLORIDE SERPL-SCNC: 107 MMOL/L
CO2 SERPL-SCNC: 25 MMOL/L
CREAT SERPL-MCNC: 0.9 MG/DL
EGFR: 109 ML/MIN/1.73M2
GLUCOSE SERPL-MCNC: 125 MG/DL
POTASSIUM SERPL-SCNC: 5 MMOL/L
PROT SERPL-MCNC: 7.5 G/DL
SODIUM SERPL-SCNC: 141 MMOL/L

## 2024-01-29 NOTE — PATIENT PROFILE ADULT - TRANSPORTATION
[de-identified] : Constitutional: Well-developed, in no acute distress  Musculoskeletal: Lumbar Spine:   Gait: Antalgic 		Inspection: Normal curvature, no abnormal kyphosis or scoliosis 		Facet loading: pain bilaterally 		Palpation: 			Lumbar and paraspinal muscles: pain bilaterally 			Sacroiliac joint: no pain 			Greater trochanter: no pain 		Muscle Strength: 		Iliopsoas: 5/5 bilaterally 		Quadriceps: 5/5 bilaterally 		Hamstrings: 5/5 bilaterally 		Tibialis anterior: 5/5 bilaterally 		Extensor hallucis longus: 5/5 bilaterally  		Sensation: normal and equal in bilateral lower extremities  Extremity: no edema noted Neurological: Memory normal, AAO x 3, Cranial nerves II - XII grossly normal Psychiatric: Appropriate mood and affect, oriented to time, place, person, and situation  
no

## 2024-02-08 ENCOUNTER — APPOINTMENT (OUTPATIENT)
Dept: BARIATRICS | Facility: CLINIC | Age: 44
End: 2024-02-08

## 2024-07-25 ENCOUNTER — APPOINTMENT (OUTPATIENT)
Dept: BARIATRICS | Facility: CLINIC | Age: 44
End: 2024-07-25
Payer: COMMERCIAL

## 2024-07-25 VITALS
OXYGEN SATURATION: 98 % | DIASTOLIC BLOOD PRESSURE: 70 MMHG | HEART RATE: 62 BPM | SYSTOLIC BLOOD PRESSURE: 103 MMHG | WEIGHT: 150 LBS | HEIGHT: 63 IN | BODY MASS INDEX: 26.58 KG/M2 | TEMPERATURE: 98.1 F

## 2024-07-25 DIAGNOSIS — Z98.84 BARIATRIC SURGERY STATUS: ICD-10-CM

## 2024-07-25 DIAGNOSIS — Z00.00 ENCOUNTER FOR GENERAL ADULT MEDICAL EXAMINATION W/OUT ABNORMAL FINDINGS: ICD-10-CM

## 2024-07-25 PROCEDURE — 99214 OFFICE O/P EST MOD 30 MIN: CPT

## 2024-07-26 NOTE — HISTORY OF PRESENT ILLNESS
[de-identified] : Patient is 43 y/o M 9 months s/p VSG who presents today for a follow up. Patient is doing well and denies n/v/c/d/ SOB, acid reflux, po intolerance, chest pain, abd pain, dizziness, fever and calf pain. Patient is compliant with vitamins and has had a 70 lbs weight loss so far. Walking for exercise and recommended to start strength training. No other concerns at this time. Patient deferred seeing the nutrition today and will follow up in 3 months.

## 2024-07-26 NOTE — HISTORY OF PRESENT ILLNESS
[de-identified] : Patient is 45 y/o M 9 months s/p VSG who presents today for a follow up. Patient is doing well and denies n/v/c/d/ SOB, acid reflux, po intolerance, chest pain, abd pain, dizziness, fever and calf pain. Patient is compliant with vitamins and has had a 70 lbs weight loss so far. Walking for exercise and recommended to start strength training. No other concerns at this time. Patient deferred seeing the nutrition today and will follow up in 3 months.

## 2024-07-26 NOTE — ASSESSMENT
[FreeTextEntry1] : Patient is 43 y/o M 9 months s/p VSG who presents today for a follow up. Patient is doing well and denies n/v/c/d/ SOB, acid reflux, po intolerance, chest pain, abd pain, dizziness, fever and calf pain. Patient is compliant with vitamins and has had a 70 lbs weight loss so far. Walking for exercise and recommended to start strength training. No other concerns at this time. Patient deferred seeing the nutrition today and will follow up in 3 months.

## 2024-07-26 NOTE — ASSESSMENT
[FreeTextEntry1] : Patient is 45 y/o M 9 months s/p VSG who presents today for a follow up. Patient is doing well and denies n/v/c/d/ SOB, acid reflux, po intolerance, chest pain, abd pain, dizziness, fever and calf pain. Patient is compliant with vitamins and has had a 70 lbs weight loss so far. Walking for exercise and recommended to start strength training. No other concerns at this time. Patient deferred seeing the nutrition today and will follow up in 3 months.

## 2024-07-26 NOTE — END OF VISIT
[Time Spent: ___ minutes] : I have spent [unfilled] minutes of time on the encounter. [FreeTextEntry3] :  All medical record entries made by the Scribe were at my, DARIA Dean , direction and personally dictated by me on 07/25/2024 . I have reviewed the chart and agree that the record accurately reflects my personal performance of the history, physical exam, assessment and plan. I have also personally directed, reviewed, and agreed with the chart.

## 2024-07-26 NOTE — ADDENDUM
[FreeTextEntry1] :  Documented by Anita Mcknight acting as a scribe for DARIA Valdez  on 07/25/2024  .

## 2024-10-24 ENCOUNTER — APPOINTMENT (OUTPATIENT)
Dept: BARIATRICS | Facility: CLINIC | Age: 44
End: 2024-10-24
Payer: COMMERCIAL

## 2024-10-24 VITALS
HEART RATE: 76 BPM | HEIGHT: 63 IN | DIASTOLIC BLOOD PRESSURE: 74 MMHG | TEMPERATURE: 97.9 F | BODY MASS INDEX: 26.4 KG/M2 | OXYGEN SATURATION: 100 % | WEIGHT: 149 LBS | SYSTOLIC BLOOD PRESSURE: 112 MMHG

## 2024-10-24 DIAGNOSIS — Z00.00 ENCOUNTER FOR GENERAL ADULT MEDICAL EXAMINATION W/OUT ABNORMAL FINDINGS: ICD-10-CM

## 2024-10-24 DIAGNOSIS — Z98.84 BARIATRIC SURGERY STATUS: ICD-10-CM

## 2024-10-24 PROCEDURE — 99214 OFFICE O/P EST MOD 30 MIN: CPT

## 2024-10-24 RX ORDER — FOLIC ACID 1 MG/1
1 TABLET ORAL DAILY
Qty: 30 | Refills: 1 | Status: ACTIVE | COMMUNITY
Start: 2024-10-24 | End: 1900-01-01

## 2024-10-24 RX ORDER — ERGOCALCIFEROL 1.25 MG/1
1.25 MG CAPSULE, LIQUID FILLED ORAL
Qty: 12 | Refills: 0 | Status: ACTIVE | COMMUNITY
Start: 2024-10-24 | End: 1900-01-01

## 2024-11-21 DIAGNOSIS — Z98.84 BARIATRIC SURGERY STATUS: ICD-10-CM

## 2025-04-03 ENCOUNTER — APPOINTMENT (OUTPATIENT)
Dept: BARIATRICS | Facility: CLINIC | Age: 45
End: 2025-04-03

## (undated) DEVICE — INSUFFLATION NDL COVIDIEN SURGINEEDLE VERESS 150MM LONG

## (undated) DEVICE — SUT VICRYL 0 54" TIES

## (undated) DEVICE — SUT MONOCRYL 4-0 27" PS-2 UNDYED

## (undated) DEVICE — XI OBTURATOR OPTICAL BLADELESS 8MM

## (undated) DEVICE — XI STAPLER SUREFORM 60

## (undated) DEVICE — XI 12MM AND STAPLER CANNULA SEAL

## (undated) DEVICE — TROCAR SURGIQUEST AIRSEAL 5MM X 100MM

## (undated) DEVICE — SUT VICRYL 3-0 27" SH

## (undated) DEVICE — VENODYNE/SCD SLEEVE CALF MEDIUM

## (undated) DEVICE — XI VESSEL SEALER

## (undated) DEVICE — FORCEP RADIAL JAW 4 W NDL 2.2MM 2.8MM 240CM ORANGE DISP

## (undated) DEVICE — Device

## (undated) DEVICE — DRAIN PENROSE 5/8" X 18" SILICONE

## (undated) DEVICE — XI SEAL UNIV 5- 8 MM

## (undated) DEVICE — XI DRAPE COLUMN

## (undated) DEVICE — XI ENDOWRIST 12 - 8 MM CANNULA REDUCER

## (undated) DEVICE — BLADE SCALPEL SAFETYLOCK #15

## (undated) DEVICE — XI DRAPE ARM

## (undated) DEVICE — MARKING PEN W RULER

## (undated) DEVICE — DRSG DERMABOND 0.7ML

## (undated) DEVICE — PACK GENERAL LAPAROSCOPY

## (undated) DEVICE — ELCTR BOVIE PENCIL BLADE 10FT

## (undated) DEVICE — SUT STRATAFIX SPIRAL PDS PLUS 2-0 30CM SH

## (undated) DEVICE — TUBING AIRSEAL TRI-LUMEN FILTERED

## (undated) DEVICE — D HELP - CLEARVIEW CLEARIFY SYSTEM

## (undated) DEVICE — SUT ETHIBOND EXCEL 2-0 36" SH